# Patient Record
Sex: MALE | Race: WHITE | Employment: UNEMPLOYED | URBAN - METROPOLITAN AREA
[De-identification: names, ages, dates, MRNs, and addresses within clinical notes are randomized per-mention and may not be internally consistent; named-entity substitution may affect disease eponyms.]

---

## 2018-10-03 ENCOUNTER — HOSPITAL ENCOUNTER (EMERGENCY)
Age: 38
Discharge: HOME | End: 2018-10-03
Attending: EMERGENCY MEDICINE
Payer: COMMERCIAL

## 2018-10-03 VITALS
RESPIRATION RATE: 16 BRPM | TEMPERATURE: 98.8 F | BODY MASS INDEX: 21.25 KG/M2 | WEIGHT: 165.57 LBS | HEART RATE: 81 BPM | HEIGHT: 74 IN | OXYGEN SATURATION: 100 % | DIASTOLIC BLOOD PRESSURE: 71 MMHG | SYSTOLIC BLOOD PRESSURE: 123 MMHG

## 2018-10-03 DIAGNOSIS — L02.419 CELLULITIS AND ABSCESS OF LEG: Primary | ICD-10-CM

## 2018-10-03 DIAGNOSIS — L03.119 CELLULITIS AND ABSCESS OF LEG: Primary | ICD-10-CM

## 2018-10-03 PROCEDURE — 4500000023 HC ED LEVEL 3 PROCEDURE

## 2018-10-03 PROCEDURE — 6370000000 HC RX 637 (ALT 250 FOR IP): Performed by: EMERGENCY MEDICINE

## 2018-10-03 PROCEDURE — 99283 EMERGENCY DEPT VISIT LOW MDM: CPT

## 2018-10-03 RX ORDER — SULFAMETHOXAZOLE AND TRIMETHOPRIM 800; 160 MG/1; MG/1
1 TABLET ORAL 2 TIMES DAILY
Qty: 20 TABLET | Refills: 0 | Status: SHIPPED | OUTPATIENT
Start: 2018-10-03 | End: 2018-10-07

## 2018-10-03 RX ORDER — SULFAMETHOXAZOLE AND TRIMETHOPRIM 800; 160 MG/1; MG/1
1 TABLET ORAL ONCE
Status: COMPLETED | OUTPATIENT
Start: 2018-10-03 | End: 2018-10-03

## 2018-10-03 RX ORDER — CEPHALEXIN 250 MG/1
500 CAPSULE ORAL ONCE
Status: COMPLETED | OUTPATIENT
Start: 2018-10-03 | End: 2018-10-03

## 2018-10-03 RX ORDER — CEPHALEXIN 250 MG/1
250 CAPSULE ORAL 4 TIMES DAILY
Qty: 40 CAPSULE | Refills: 0 | Status: SHIPPED | OUTPATIENT
Start: 2018-10-03 | End: 2019-01-13

## 2018-10-03 RX ADMIN — CEPHALEXIN 500 MG: 250 CAPSULE ORAL at 22:33

## 2018-10-03 RX ADMIN — SULFAMETHOXAZOLE AND TRIMETHOPRIM 1 TABLET: 800; 160 TABLET ORAL at 22:33

## 2018-10-03 ASSESSMENT — ENCOUNTER SYMPTOMS
STRIDOR: 0
DIARRHEA: 0
CONSTIPATION: 0
CHOKING: 0
CHEST TIGHTNESS: 0
APNEA: 0
BLOOD IN STOOL: 0
COUGH: 0
EYE REDNESS: 0
EYE PAIN: 0
ANAL BLEEDING: 0
EYE DISCHARGE: 0
ABDOMINAL DISTENTION: 0
BACK PAIN: 0
COLOR CHANGE: 0
PHOTOPHOBIA: 0
VOMITING: 0
WHEEZING: 0
EYE ITCHING: 0
RECTAL PAIN: 0
ABDOMINAL PAIN: 0
SHORTNESS OF BREATH: 0
NAUSEA: 0

## 2018-10-03 ASSESSMENT — PAIN - FUNCTIONAL ASSESSMENT: PAIN_FUNCTIONAL_ASSESSMENT: 0-10

## 2018-10-03 ASSESSMENT — PAIN SCALES - GENERAL
PAINLEVEL_OUTOF10: 6
PAINLEVEL_OUTOF10: 6

## 2018-10-07 ENCOUNTER — HOSPITAL ENCOUNTER (EMERGENCY)
Age: 38
Discharge: HOME | End: 2018-10-07
Payer: COMMERCIAL

## 2018-10-07 VITALS
WEIGHT: 160.94 LBS | HEIGHT: 74 IN | HEART RATE: 71 BPM | BODY MASS INDEX: 20.65 KG/M2 | TEMPERATURE: 98.4 F | DIASTOLIC BLOOD PRESSURE: 79 MMHG | SYSTOLIC BLOOD PRESSURE: 125 MMHG | RESPIRATION RATE: 18 BRPM | OXYGEN SATURATION: 100 %

## 2018-10-07 DIAGNOSIS — L02.415 ABSCESS OF RIGHT LEG: ICD-10-CM

## 2018-10-07 DIAGNOSIS — L02.414 ABSCESS OF LEFT FOREARM: Primary | ICD-10-CM

## 2018-10-07 LAB
ANION GAP SERPL CALCULATED.3IONS-SCNC: 12 MMOL/L (ref 3–16)
BASOPHILS ABSOLUTE: 0.1 K/UL (ref 0–0.2)
BASOPHILS RELATIVE PERCENT: 0.8 %
BUN BLDV-MCNC: 7 MG/DL (ref 7–20)
CALCIUM SERPL-MCNC: 9.4 MG/DL (ref 8.3–10.6)
CHLORIDE BLD-SCNC: 97 MMOL/L (ref 99–110)
CO2: 28 MMOL/L (ref 21–32)
CREAT SERPL-MCNC: 0.8 MG/DL (ref 0.9–1.3)
EOSINOPHILS ABSOLUTE: 0.4 K/UL (ref 0–0.6)
EOSINOPHILS RELATIVE PERCENT: 4.1 %
GFR AFRICAN AMERICAN: >60
GFR NON-AFRICAN AMERICAN: >60
GLUCOSE BLD-MCNC: 88 MG/DL (ref 70–99)
HCT VFR BLD CALC: 41 % (ref 40.5–52.5)
HEMOGLOBIN: 14.3 G/DL (ref 13.5–17.5)
LACTIC ACID: 1 MMOL/L (ref 0.4–2)
LYMPHOCYTES ABSOLUTE: 2.6 K/UL (ref 1–5.1)
LYMPHOCYTES RELATIVE PERCENT: 26.3 %
MCH RBC QN AUTO: 29.2 PG (ref 26–34)
MCHC RBC AUTO-ENTMCNC: 34.9 G/DL (ref 31–36)
MCV RBC AUTO: 83.8 FL (ref 80–100)
MONOCYTES ABSOLUTE: 0.7 K/UL (ref 0–1.3)
MONOCYTES RELATIVE PERCENT: 7.3 %
NEUTROPHILS ABSOLUTE: 6.1 K/UL (ref 1.7–7.7)
NEUTROPHILS RELATIVE PERCENT: 61.5 %
PDW BLD-RTO: 14.8 % (ref 12.4–15.4)
PLATELET # BLD: 425 K/UL (ref 135–450)
PMV BLD AUTO: 6.9 FL (ref 5–10.5)
POTASSIUM SERPL-SCNC: 4.7 MMOL/L (ref 3.5–5.1)
RBC # BLD: 4.89 M/UL (ref 4.2–5.9)
SODIUM BLD-SCNC: 137 MMOL/L (ref 136–145)
WBC # BLD: 10 K/UL (ref 4–11)

## 2018-10-07 PROCEDURE — 36415 COLL VENOUS BLD VENIPUNCTURE: CPT

## 2018-10-07 PROCEDURE — 4500000023 HC ED LEVEL 3 PROCEDURE

## 2018-10-07 PROCEDURE — 80048 BASIC METABOLIC PNL TOTAL CA: CPT

## 2018-10-07 PROCEDURE — 85025 COMPLETE CBC W/AUTO DIFF WBC: CPT

## 2018-10-07 PROCEDURE — 99283 EMERGENCY DEPT VISIT LOW MDM: CPT

## 2018-10-07 PROCEDURE — 2500000003 HC RX 250 WO HCPCS

## 2018-10-07 PROCEDURE — 83605 ASSAY OF LACTIC ACID: CPT

## 2018-10-07 RX ORDER — SULFAMETHOXAZOLE AND TRIMETHOPRIM 800; 160 MG/1; MG/1
2 TABLET ORAL 2 TIMES DAILY
Qty: 40 TABLET | Refills: 0 | Status: SHIPPED | OUTPATIENT
Start: 2018-10-07 | End: 2018-10-17

## 2018-10-07 ASSESSMENT — PAIN SCALES - GENERAL: PAINLEVEL_OUTOF10: 8

## 2018-10-07 ASSESSMENT — PAIN - FUNCTIONAL ASSESSMENT: PAIN_FUNCTIONAL_ASSESSMENT: 0-10

## 2018-10-07 NOTE — ED PROVIDER NOTES
immediate complications      ED COURSE & MEDICAL DECISION MAKING    See chart for details of medications prescribed. Vitals:    10/07/18 1821   BP: 125/79   Pulse: 71   Resp: 18   Temp: 98.4 °F (36.9 °C)   TempSrc: Oral   SpO2: 100%   Weight: 160 lb 15 oz (73 kg)   Height: 6' 2\" (1.88 m)       Differential diagnosis: necrotizing fasciitis, deep space soft tissue bacterial skin infection, viral rash, systemic infectious rash, aseptic cyst, malignancy, lymphadenopathy, other    Patient is afebrile and nontoxic in appearance. No constitutional systems. Labs reveal no leukocytosis. Lactic acid within normal limits. No evidence of sepsis at this time. Bactrim dose increased to 2 tabs BID. Keflex will be continued. I instructed the patient to follow up in 2 days for wound recheck and packing removal.  I instructed the patient to return to the ED immediately for any new or worsening symptoms. The patient verbalized understanding. I have evaluated this patient. My supervising physician was available for consultation. FINAL IMPRESSION    1. Abscess of left forearm    2.  Abscess of right leg        PLAN  Antibiotics, oral analgesics, follow-up (see EMR)      (Please note that this note was completed with a voice recognition program.  Every attempt was made to edit the dictations, but inevitably there remain words that are mis-transcribed.)                  Tima Colon  10/07/18 2041

## 2019-01-13 ENCOUNTER — HOSPITAL ENCOUNTER (EMERGENCY)
Age: 39
Discharge: HOME | End: 2019-01-13

## 2019-01-13 VITALS
OXYGEN SATURATION: 97 % | SYSTOLIC BLOOD PRESSURE: 117 MMHG | BODY MASS INDEX: 24.78 KG/M2 | DIASTOLIC BLOOD PRESSURE: 73 MMHG | HEIGHT: 74 IN | WEIGHT: 193.12 LBS | RESPIRATION RATE: 17 BRPM | TEMPERATURE: 98.6 F | HEART RATE: 83 BPM

## 2019-01-13 DIAGNOSIS — L02.31 ABSCESS OF BUTTOCK: Primary | ICD-10-CM

## 2019-01-13 PROCEDURE — 99282 EMERGENCY DEPT VISIT SF MDM: CPT

## 2019-01-13 RX ORDER — SULFAMETHOXAZOLE AND TRIMETHOPRIM 800; 160 MG/1; MG/1
1 TABLET ORAL 2 TIMES DAILY
Qty: 20 TABLET | Refills: 0 | Status: SHIPPED | OUTPATIENT
Start: 2019-01-13 | End: 2019-01-23

## 2019-01-13 RX ORDER — CEPHALEXIN 500 MG/1
500 CAPSULE ORAL 4 TIMES DAILY
Qty: 40 CAPSULE | Refills: 0 | Status: SHIPPED | OUTPATIENT
Start: 2019-01-13 | End: 2021-06-02

## 2019-01-13 ASSESSMENT — PAIN DESCRIPTION - LOCATION: LOCATION: BUTTOCKS

## 2019-01-13 ASSESSMENT — ENCOUNTER SYMPTOMS
SHORTNESS OF BREATH: 0
ABDOMINAL PAIN: 0
EYE DISCHARGE: 0
BACK PAIN: 0
APNEA: 0
FACIAL SWELLING: 0
VOMITING: 0
EYE REDNESS: 0
CHOKING: 0
NAUSEA: 0

## 2019-01-13 ASSESSMENT — PAIN SCALES - GENERAL
PAINLEVEL_OUTOF10: 4
PAINLEVEL_OUTOF10: 8

## 2019-01-13 ASSESSMENT — PAIN - FUNCTIONAL ASSESSMENT: PAIN_FUNCTIONAL_ASSESSMENT: 0-10

## 2019-01-13 ASSESSMENT — PAIN DESCRIPTION - PAIN TYPE: TYPE: ACUTE PAIN

## 2019-01-13 ASSESSMENT — PAIN DESCRIPTION - DESCRIPTORS: DESCRIPTORS: ACHING

## 2019-01-13 ASSESSMENT — PAIN DESCRIPTION - PROGRESSION: CLINICAL_PROGRESSION: NOT CHANGED

## 2019-01-13 ASSESSMENT — PAIN DESCRIPTION - FREQUENCY: FREQUENCY: CONTINUOUS

## 2021-06-02 ENCOUNTER — HOSPITAL ENCOUNTER (EMERGENCY)
Age: 41
Discharge: HOME | End: 2021-06-02
Payer: COMMERCIAL

## 2021-06-02 VITALS
RESPIRATION RATE: 16 BRPM | DIASTOLIC BLOOD PRESSURE: 80 MMHG | HEART RATE: 61 BPM | TEMPERATURE: 98 F | OXYGEN SATURATION: 100 % | SYSTOLIC BLOOD PRESSURE: 137 MMHG

## 2021-06-02 DIAGNOSIS — K04.7 ABSCESSED TOOTH: Primary | ICD-10-CM

## 2021-06-02 PROCEDURE — 99283 EMERGENCY DEPT VISIT LOW MDM: CPT

## 2021-06-02 RX ORDER — IBUPROFEN 600 MG/1
600 TABLET ORAL EVERY 6 HOURS PRN
Qty: 30 TABLET | Refills: 0 | Status: SHIPPED | OUTPATIENT
Start: 2021-06-02

## 2021-06-02 RX ORDER — AMOXICILLIN 500 MG/1
1000 CAPSULE ORAL 2 TIMES DAILY
Qty: 40 CAPSULE | Refills: 0 | Status: SHIPPED | OUTPATIENT
Start: 2021-06-02 | End: 2021-06-12

## 2021-06-02 ASSESSMENT — PAIN DESCRIPTION - LOCATION
LOCATION: TEETH
LOCATION: TEETH

## 2021-06-02 ASSESSMENT — PAIN SCALES - GENERAL
PAINLEVEL_OUTOF10: 8
PAINLEVEL_OUTOF10: 7

## 2021-06-02 ASSESSMENT — ENCOUNTER SYMPTOMS
SHORTNESS OF BREATH: 0
VOICE CHANGE: 0
FACIAL SWELLING: 1
NAUSEA: 0
VOMITING: 0
TROUBLE SWALLOWING: 0

## 2021-06-02 ASSESSMENT — PAIN DESCRIPTION - PAIN TYPE: TYPE: ACUTE PAIN

## 2021-06-02 ASSESSMENT — PAIN DESCRIPTION - ORIENTATION: ORIENTATION: LEFT;UPPER

## 2021-06-02 ASSESSMENT — PAIN DESCRIPTION - DESCRIPTORS
DESCRIPTORS: CONSTANT;THROBBING
DESCRIPTORS: ACHING

## 2021-06-02 NOTE — ED PROVIDER NOTES
[address] [facility]      Pt Name: Addy Mari  MRN: [id number]  [place] 1980  Date of evaluation: 6/2/2021  Provider: Yasir Neumann PA-C    This patient was not seen and evaluated by the attending physician No att. providers found. CHIEF COMPLAINT       Chief Complaint   Patient presents with    Dental Problem     left upper side, started 2 days ago. \"I need abx\"         HISTORYOF PRESENT ILLNESS  (Location/Symptom, Timing/Onset, Context/Setting, Quality, Duration, Modifying Factors, Severity.)   Addy Mari is a 38 y.o. male who presents to the emergency department with dental abscess. 2 days ago he developed pain and swelling about his left upper tooth pain. Pain has been constant since that time. It worsens with chewing. Nothing makes it better. He rates it 8 out of 10. He denies any fevers, chills, difficulty breathing or swallowing, drooling or voice changes. He does not have a dentist.      Nursing Notes were reviewedand I agree. REVIEW OF SYSTEMS    (2-9 systems for level 4, 10 or more forlevel 5)     Review of Systems   Constitutional: Negative for chills and fever. HENT: Positive for dental problem and facial swelling. Negative for trouble swallowing and voice change. Respiratory: Negative for shortness of breath. Gastrointestinal: Negative for nausea and vomiting. Musculoskeletal: Negative for neck pain. Neurological: Negative for headaches. All other systems reviewed and are negative. Except as noted above the remainder ofthe review of systems was reviewed and negative.        PAST MEDICALHISTORY         Diagnosis Date    Opiate abuse, episodic ([facility] [address]        SURGICAL HISTORY           Procedure Laterality Date    ABDOMEN SURGERY      spleen    DENTAL SURGERY      FRACTURE SURGERY      left ankle       CURRENT MEDICATIONS       Previous Medications    No medications on file       ALLERGIES Patient has no known allergies. FAMILY HISTORY     History reviewed. No pertinent family history. No family status information on file. SOCIAL HISTORY    reports that he has been smoking cigarettes. He has been smoking about 0.50 packs per day. He has never used smokeless tobacco. He reports current drug use. Drugs: IV and Opiates . He reports that he does not drink alcohol. PHYSICAL EXAM    (up to 7 for level 4, 8 or more for level 5)     ED Triage Vitals [06/02/21 0930]   BP Temp Temp Source Pulse Resp SpO2 Height Weight   137/80 98 °F (36.7 °C) Oral 61 16 100 % -- --       Physical Exam  Vitals and nursing note reviewed. Constitutional:       General: He is not in acute distress. Appearance: He is well-developed. HENT:      Head: Normocephalic and atraumatic. Mouth/Throat:        Comments: All teeth are severely decayed to the gumline. There is a bit of upper lip swelling on the left. Pulmonary:      Effort: Pulmonary effort is normal. No respiratory distress. Musculoskeletal:         General: Normal range of motion. Cervical back: Neck supple. Skin:     General: Skin is warm and dry. Neurological:      Mental Status: He is alert and oriented to person, place, and time. Psychiatric:         Behavior: Behavior normal.              EMERGENCY DEPARTMENT COURSE and DIFFERENTIAL DIAGNOSIS/MDM:   Vitals:    Vitals:    06/02/21 0930   BP: 137/80   Pulse: 61   Resp: 16   Temp: 98 °F (36.7 °C)   TempSrc: Oral   SpO2: 100%        I have evaluated this patient. My supervising physician was available for consultation. I estimate there is LOW risk for a DEEP SPACE INFECTION (e.g., CHRISTIANS ANGINA OR RETROPHARYNGEAL ABSCESS), MENINGITIS, or AIRWAY COMPROMISE, thus I consider the discharge disposition reasonable. Also, there is no evidence or peritonitis, sepsis, or toxicity. Discussed results, diagnosis and plan with patient and/or family. Questions addressed.  Eladia Lopez follow-up agreed upon. Specific discharge instructions explained. The patient and/or family and I have discussed the diagnosis and risks, and we agree with discharging home to follow-up with their primary care,specialist or referral doctor. We also discussed returning to the Emergency Department immediately if new or worsening symptoms occur. We have discussed the symptoms which are most concerning that necessitate immediatereturn. PROCEDURES:  None    FINAL IMPRESSION      1.  Abscessed tooth          DISPOSITION/PLAN   DISPOSITION Decision To Discharge 06/02/2021 09:40:35 AM      PATIENT REFERRED TO:  [facility]. [phone]  [id number] [address]  [phone]    Schedule an appointment as soon as possible for a visit   or see dental clinic list below      MEDICATIONS:  New Prescriptions    AMOXICILLIN (AMOXIL) 500 MG CAPSULE    Take 2 capsules by mouth 2 times daily for 10 days    IBUPROFEN (IBU) 600 MG TABLET    Take 1 tablet by mouth every 6 hours as needed for Pain       (Please note that portions of this note were completed with a voice recognition program.  Efforts were made toedit the dictations but occasionally words are mis-transcribed.)    NOMAN Casper PA-C  06/02/21 9946

## 2024-05-15 ENCOUNTER — APPOINTMENT (OUTPATIENT)
Dept: GENERAL RADIOLOGY | Age: 44
DRG: 720 | End: 2024-05-15
Payer: COMMERCIAL

## 2024-05-15 ENCOUNTER — HOSPITAL ENCOUNTER (EMERGENCY)
Age: 44
Discharge: HOME | DRG: 720 | End: 2024-05-15
Attending: EMERGENCY MEDICINE
Payer: COMMERCIAL

## 2024-05-15 VITALS
BODY MASS INDEX: 21.48 KG/M2 | TEMPERATURE: 98.3 F | WEIGHT: 167.4 LBS | OXYGEN SATURATION: 95 % | HEIGHT: 74 IN | RESPIRATION RATE: 16 BRPM | HEART RATE: 88 BPM | DIASTOLIC BLOOD PRESSURE: 78 MMHG | SYSTOLIC BLOOD PRESSURE: 110 MMHG

## 2024-05-15 DIAGNOSIS — N30.01 ACUTE CYSTITIS WITH HEMATURIA: Primary | ICD-10-CM

## 2024-05-15 DIAGNOSIS — F14.10 NONDEPENDENT COCAINE ABUSE (HCC): ICD-10-CM

## 2024-05-15 DIAGNOSIS — Z20.822 LAB TEST NEGATIVE FOR COVID-19 VIRUS: ICD-10-CM

## 2024-05-15 DIAGNOSIS — R41.89 BRAIN FOG: ICD-10-CM

## 2024-05-15 DIAGNOSIS — E86.0 DEHYDRATION: ICD-10-CM

## 2024-05-15 DIAGNOSIS — J18.9 PNEUMONIA OF BOTH LOWER LOBES DUE TO INFECTIOUS ORGANISM: ICD-10-CM

## 2024-05-15 LAB
ALBUMIN SERPL-MCNC: 3.3 G/DL (ref 3.4–5)
ALBUMIN/GLOB SERPL: 0.9 {RATIO} (ref 1.1–2.2)
ALP SERPL-CCNC: 160 U/L (ref 40–129)
ALT SERPL-CCNC: 51 U/L (ref 10–40)
AMORPH SED URNS QL MICRO: ABNORMAL /HPF
AMPHETAMINES UR QL SCN>1000 NG/ML: POSITIVE
ANION GAP SERPL CALCULATED.3IONS-SCNC: 15 MMOL/L (ref 3–16)
AST SERPL-CCNC: 70 U/L (ref 15–37)
BACTERIA URNS QL MICRO: ABNORMAL /HPF
BARBITURATES UR QL SCN>200 NG/ML: ABNORMAL
BASE EXCESS BLDV CALC-SCNC: -1 MMOL/L (ref -3–3)
BASOPHILS # BLD: 0.1 K/UL (ref 0–0.2)
BASOPHILS NFR BLD: 1 %
BENZODIAZ UR QL SCN>200 NG/ML: ABNORMAL
BILIRUB SERPL-MCNC: 2.1 MG/DL (ref 0–1)
BILIRUB UR QL STRIP.AUTO: ABNORMAL
BUN SERPL-MCNC: 44 MG/DL (ref 7–20)
CALCIUM SERPL-MCNC: 9.6 MG/DL (ref 8.3–10.6)
CANNABINOIDS UR QL SCN>50 NG/ML: ABNORMAL
CHLORIDE SERPL-SCNC: 96 MMOL/L (ref 99–110)
CLARITY UR: ABNORMAL
CO2 BLDV-SCNC: 25 MMOL/L
CO2 SERPL-SCNC: 23 MMOL/L (ref 21–32)
COCAINE UR QL SCN: POSITIVE
COLOR UR: ABNORMAL
CREAT SERPL-MCNC: 1.2 MG/DL (ref 0.9–1.3)
DEPRECATED RDW RBC AUTO: 14.2 % (ref 12.4–15.4)
DRUG SCREEN COMMENT UR-IMP: ABNORMAL
EOSINOPHIL # BLD: 0.1 K/UL (ref 0–0.6)
EOSINOPHIL NFR BLD: 1 %
EPI CELLS #/AREA URNS HPF: ABNORMAL /HPF (ref 0–5)
FENTANYL SCREEN, URINE: POSITIVE
FLUAV RNA UPPER RESP QL NAA+PROBE: NEGATIVE
FLUBV AG NPH QL: NEGATIVE
GFR SERPLBLD CREATININE-BSD FMLA CKD-EPI: 76 ML/MIN/{1.73_M2}
GLUCOSE SERPL-MCNC: 127 MG/DL (ref 70–99)
GLUCOSE UR STRIP.AUTO-MCNC: NEGATIVE MG/DL
HCO3 BLDV-SCNC: 24.9 MMOL/L (ref 23–29)
HCT VFR BLD AUTO: 46.2 % (ref 40.5–52.5)
HGB BLD-MCNC: 15.8 G/DL (ref 13.5–17.5)
HGB UR QL STRIP.AUTO: ABNORMAL
KETONES UR STRIP.AUTO-MCNC: NEGATIVE MG/DL
LACTATE BLDV-SCNC: 2.6 MMOL/L (ref 0.4–2)
LACTATE BLDV-SCNC: 3.6 MMOL/L (ref 0.4–2)
LEUKOCYTE ESTERASE UR QL STRIP.AUTO: ABNORMAL
LIPASE SERPL-CCNC: 9 U/L (ref 13–60)
LYMPHOCYTES # BLD: 1.3 K/UL (ref 1–5.1)
LYMPHOCYTES NFR BLD: 9 %
MCH RBC QN AUTO: 30.3 PG (ref 26–34)
MCHC RBC AUTO-ENTMCNC: 34.2 G/DL (ref 31–36)
MCV RBC AUTO: 88.7 FL (ref 80–100)
METHADONE UR QL SCN>300 NG/ML: ABNORMAL
MONOCYTES # BLD: 1 K/UL (ref 0–1.3)
MONOCYTES NFR BLD: 7 %
NEUTROPHILS # BLD: 11.4 K/UL (ref 1.7–7.7)
NEUTROPHILS NFR BLD: 80 %
NEUTS BAND NFR BLD MANUAL: 2 % (ref 0–7)
NITRITE UR QL STRIP.AUTO: POSITIVE
O2 THERAPY: ABNORMAL
OPIATES UR QL SCN>300 NG/ML: ABNORMAL
OXYCODONE UR QL SCN: ABNORMAL
PCO2 BLDV: 46.8 MMHG (ref 40–50)
PCP UR QL SCN>25 NG/ML: ABNORMAL
PH BLDV: 7.33 [PH] (ref 7.35–7.45)
PH UR STRIP.AUTO: 6 [PH] (ref 5–8)
PH UR STRIP: 6 [PH]
PLATELET # BLD AUTO: 71 K/UL (ref 135–450)
PLATELET BLD QL SMEAR: ABNORMAL
PMV BLD AUTO: 10.4 FL (ref 5–10.5)
PO2 BLDV: 34.6 MMHG (ref 25–40)
POTASSIUM SERPL-SCNC: 4.7 MMOL/L (ref 3.5–5.1)
PROT SERPL-MCNC: 7.1 G/DL (ref 6.4–8.2)
PROT UR STRIP.AUTO-MCNC: 30 MG/DL
RBC # BLD AUTO: 5.21 M/UL (ref 4.2–5.9)
RBC #/AREA URNS HPF: ABNORMAL /HPF (ref 0–4)
S PYO AG THROAT QL: NEGATIVE
SAO2 % BLDV: 62 %
SARS-COV-2 RDRP RESP QL NAA+PROBE: NOT DETECTED
SLIDE REVIEW: ABNORMAL
SODIUM SERPL-SCNC: 134 MMOL/L (ref 136–145)
SP GR UR STRIP.AUTO: 1.01 (ref 1–1.03)
UA COMPLETE W REFLEX CULTURE PNL UR: YES
UA DIPSTICK W REFLEX MICRO PNL UR: YES
URN SPEC COLLECT METH UR: ABNORMAL
UROBILINOGEN UR STRIP-ACNC: 4 E.U./DL
WBC # BLD AUTO: 13.9 K/UL (ref 4–11)
WBC #/AREA URNS HPF: ABNORMAL /HPF (ref 0–5)

## 2024-05-15 PROCEDURE — 87804 INFLUENZA ASSAY W/OPTIC: CPT

## 2024-05-15 PROCEDURE — 83690 ASSAY OF LIPASE: CPT

## 2024-05-15 PROCEDURE — 87086 URINE CULTURE/COLONY COUNT: CPT

## 2024-05-15 PROCEDURE — 87040 BLOOD CULTURE FOR BACTERIA: CPT

## 2024-05-15 PROCEDURE — 87081 CULTURE SCREEN ONLY: CPT

## 2024-05-15 PROCEDURE — 80307 DRUG TEST PRSMV CHEM ANLYZR: CPT

## 2024-05-15 PROCEDURE — 6370000000 HC RX 637 (ALT 250 FOR IP): Performed by: EMERGENCY MEDICINE

## 2024-05-15 PROCEDURE — 87186 SC STD MICRODIL/AGAR DIL: CPT

## 2024-05-15 PROCEDURE — 71046 X-RAY EXAM CHEST 2 VIEWS: CPT

## 2024-05-15 PROCEDURE — 82803 BLOOD GASES ANY COMBINATION: CPT

## 2024-05-15 PROCEDURE — 36415 COLL VENOUS BLD VENIPUNCTURE: CPT

## 2024-05-15 PROCEDURE — 2580000003 HC RX 258: Performed by: EMERGENCY MEDICINE

## 2024-05-15 PROCEDURE — 99284 EMERGENCY DEPT VISIT MOD MDM: CPT

## 2024-05-15 PROCEDURE — 87635 SARS-COV-2 COVID-19 AMP PRB: CPT

## 2024-05-15 PROCEDURE — 87150 DNA/RNA AMPLIFIED PROBE: CPT

## 2024-05-15 PROCEDURE — 83605 ASSAY OF LACTIC ACID: CPT

## 2024-05-15 PROCEDURE — 87880 STREP A ASSAY W/OPTIC: CPT

## 2024-05-15 PROCEDURE — 87077 CULTURE AEROBIC IDENTIFY: CPT

## 2024-05-15 PROCEDURE — 96361 HYDRATE IV INFUSION ADD-ON: CPT

## 2024-05-15 PROCEDURE — 85025 COMPLETE CBC W/AUTO DIFF WBC: CPT

## 2024-05-15 PROCEDURE — 81001 URINALYSIS AUTO W/SCOPE: CPT

## 2024-05-15 PROCEDURE — 6360000002 HC RX W HCPCS: Performed by: EMERGENCY MEDICINE

## 2024-05-15 PROCEDURE — 96374 THER/PROPH/DIAG INJ IV PUSH: CPT

## 2024-05-15 PROCEDURE — 80053 COMPREHEN METABOLIC PANEL: CPT

## 2024-05-15 RX ORDER — PREDNISONE 20 MG/1
40 TABLET ORAL ONCE
Status: COMPLETED | OUTPATIENT
Start: 2024-05-15 | End: 2024-05-15

## 2024-05-15 RX ORDER — 0.9 % SODIUM CHLORIDE 0.9 %
1000 INTRAVENOUS SOLUTION INTRAVENOUS ONCE
Status: COMPLETED | OUTPATIENT
Start: 2024-05-15 | End: 2024-05-15

## 2024-05-15 RX ORDER — METHYLPREDNISOLONE 4 MG/1
4 TABLET ORAL SEE ADMIN INSTRUCTIONS
Qty: 1 KIT | Refills: 0 | Status: SHIPPED | OUTPATIENT
Start: 2024-05-15 | End: 2024-05-21

## 2024-05-15 RX ORDER — AZITHROMYCIN 500 MG/1
500 TABLET, FILM COATED ORAL ONCE
Status: COMPLETED | OUTPATIENT
Start: 2024-05-15 | End: 2024-05-15

## 2024-05-15 RX ORDER — ALBUTEROL SULFATE 90 UG/1
2 AEROSOL, METERED RESPIRATORY (INHALATION) EVERY 4 HOURS PRN
Qty: 18 G | Refills: 0 | Status: SHIPPED | OUTPATIENT
Start: 2024-05-15

## 2024-05-15 RX ORDER — AMOXICILLIN AND CLAVULANATE POTASSIUM 875; 125 MG/1; MG/1
1 TABLET, FILM COATED ORAL 2 TIMES DAILY
Qty: 20 TABLET | Refills: 0 | Status: SHIPPED | OUTPATIENT
Start: 2024-05-15 | End: 2024-05-25

## 2024-05-15 RX ADMIN — PREDNISONE 40 MG: 20 TABLET ORAL at 12:16

## 2024-05-15 RX ADMIN — WATER 1000 MG: 1 INJECTION INTRAMUSCULAR; INTRAVENOUS; SUBCUTANEOUS at 17:28

## 2024-05-15 RX ADMIN — SODIUM CHLORIDE 1000 ML: 9 INJECTION, SOLUTION INTRAVENOUS at 17:20

## 2024-05-15 RX ADMIN — AZITHROMYCIN 500 MG: 500 TABLET, FILM COATED ORAL at 17:28

## 2024-05-15 RX ADMIN — SODIUM CHLORIDE 1000 ML: 9 INJECTION, SOLUTION INTRAVENOUS at 14:54

## 2024-05-15 ASSESSMENT — PAIN SCALES - GENERAL
PAINLEVEL_OUTOF10: 3
PAINLEVEL_OUTOF10: 5
PAINLEVEL_OUTOF10: 10

## 2024-05-15 ASSESSMENT — PAIN DESCRIPTION - ORIENTATION
ORIENTATION: RIGHT;LEFT

## 2024-05-15 ASSESSMENT — PAIN DESCRIPTION - FREQUENCY
FREQUENCY: CONTINUOUS

## 2024-05-15 ASSESSMENT — PAIN DESCRIPTION - ONSET
ONSET: GRADUAL
ONSET: GRADUAL

## 2024-05-15 ASSESSMENT — PAIN - FUNCTIONAL ASSESSMENT
PAIN_FUNCTIONAL_ASSESSMENT: ACTIVITIES ARE NOT PREVENTED
PAIN_FUNCTIONAL_ASSESSMENT: 0-10
PAIN_FUNCTIONAL_ASSESSMENT: 0-10
PAIN_FUNCTIONAL_ASSESSMENT: ACTIVITIES ARE NOT PREVENTED

## 2024-05-15 ASSESSMENT — LIFESTYLE VARIABLES
HOW OFTEN DO YOU HAVE A DRINK CONTAINING ALCOHOL: NEVER
HOW MANY STANDARD DRINKS CONTAINING ALCOHOL DO YOU HAVE ON A TYPICAL DAY: PATIENT DOES NOT DRINK

## 2024-05-15 ASSESSMENT — PAIN DESCRIPTION - LOCATION
LOCATION: LEG

## 2024-05-15 ASSESSMENT — PAIN DESCRIPTION - DESCRIPTORS
DESCRIPTORS: SORE

## 2024-05-15 ASSESSMENT — PAIN DESCRIPTION - PAIN TYPE
TYPE: ACUTE PAIN
TYPE: ACUTE PAIN

## 2024-05-15 NOTE — ED NOTES
This RN attempted twice to place an PIV; both attempts unsuccessful. I asked PIETER Villeda to attempt.

## 2024-05-15 NOTE — ED NOTES
Diabetes is unchanged.   Continue current treatment regimen.  Diabetes will be reassessed in 3 months.    Discussed importance of medication compliance and better control over blood sugars. Pt to have A1C checked today, continue regular follow up with Dr. Siddiqi.      Pt is currently working on a urine sample for this RN.

## 2024-05-15 NOTE — ED PROVIDER NOTES
[facility]  EMERGENCY DEPARTMENT ENCOUNTER      Pt Name: Romeo Handley  MRN: [id number]  Birthdate 1980  Date of evaluation: 5/15/2024  Provider: KARLO HERNÁNDEZ DO    CHIEF COMPLAINT  Chief Complaint   Patient presents with    Pain     Pt states that he has been shooting fentanyl in his lower legs the past few days, he reports Friday he has been c/o leg pain that started in the right and has since traveled to the [facility], pt states he is having trouble bearing weight , he reports feeling weak all over  pt has strong bi lat pedal pulses and cap refill < 3 ...     Cough     States had a slight cough for the past few days reports a fever at home          This patient is at risk for a communicable infection.  Therefore, personal protection equipment consisting of a mask was worn for the exam.    HPI  Romeo Handley is a 43 y.o. male who presents with complaint of shooting fentanyl in his lower legs the past few days.  He has used methamphetamines in the past but has quit using methamphetamines.  He states he is having chest pain with coughing.  Denies any fevers or chills.  His wife later came in and gave a history that he was having periods of confusion.  He smokes 1 pack of cigarettes per day.  He denies any exposure to flu or COVID.  Denies any previous history of pulmonary embolus or DVTs.  He does have a history of IV drug abuse and opiate abuse.  He states he been feeling weak all over.  He denies any focal neurologic signs.  He denies any wheezing.  ?  REVIEW OF SYSTEMS  All systems negative except as noted in the HPI.  Reviewed Nurses' notes and concur.    No LMP for male patient.    PAST MEDICAL HISTORY  Past Medical History:   Diagnosis Date    Opiate abuse, episodic (HCC)        FAMILY HISTORY  History reviewed. No pertinent family history.    SOCIAL HISTORY   reports that he has been smoking cigarettes. He has never used smokeless tobacco. He reports current drug use.  No masses, No pulsatile masses, No distension, normal bowel sounds  Skin: Warm, Dry  Back: No tenderness, Full range of motion, No scoliosis.  Extremities: no major deformities noted.  Neurologic: Alert & oriented x 3, CN II through XII are intact, normal motor function, normal sensory function, no focal deficits noted, no clonus, no tremors.  Psychiatric: Affect normal, Mood normal.    COURSE & MEDICAL DECISION MAKING  Pertinent Labs & Imaging studies reviewed. (See chart for details)    LABORATORY  Labs Reviewed   CBC WITH AUTO DIFFERENTIAL - Abnormal; Notable for the following components:       Result Value    WBC 13.9 (*)     Platelets 71 (*)     Neutrophils Absolute 11.4 (*)     All other components within normal limits   COMPREHENSIVE METABOLIC PANEL W/ REFLEX TO MG FOR LOW K - Abnormal; Notable for the following components:    Sodium 134 (*)     Chloride 96 (*)     Glucose 127 (*)     BUN 44 (*)     Albumin 3.3 (*)     Albumin/Globulin Ratio 0.9 (*)     Total Bilirubin 2.1 (*)     Alkaline Phosphatase 160 (*)     ALT 51 (*)     AST 70 (*)     All other components within normal limits   LIPASE - Abnormal; Notable for the following components:    Lipase 9.0 (*)     All other components within normal limits   URINALYSIS WITH REFLEX TO CULTURE - Abnormal; Notable for the following components:    Color, UA DARK YELLOW (*)     Clarity, UA CLOUDY (*)     Bilirubin, Urine SMALL (*)     Blood, Urine LARGE (*)     Protein, UA 30 (*)     Urobilinogen, Urine 4.0 (*)     Nitrite, Urine POSITIVE (*)     Leukocyte Esterase, Urine SMALL (*)     All other components within normal limits   BLOOD GAS, VENOUS - Abnormal; Notable for the following components:    pH, Romel 7.333 (*)     All other components within normal limits   URINE DRUG SCREEN - Abnormal; Notable for the following components:    Amphetamine Screen, Ur POSITIVE (*)     Cocaine Metabolite Screen, Urine POSITIVE (*)     FENTANYL SCREEN, URINE POSITIVE (*)     All

## 2024-05-16 LAB — REPORT: NORMAL

## 2024-05-17 ENCOUNTER — HOSPITAL ENCOUNTER (INPATIENT)
Age: 44
LOS: 1 days | Discharge: ANOTHER HOSPITAL | DRG: 720 | End: 2024-05-18
Attending: STUDENT IN AN ORGANIZED HEALTH CARE EDUCATION/TRAINING PROGRAM | Admitting: HOSPITALIST
Payer: COMMERCIAL

## 2024-05-17 ENCOUNTER — APPOINTMENT (OUTPATIENT)
Dept: GENERAL RADIOLOGY | Age: 44
DRG: 720 | End: 2024-05-17
Payer: COMMERCIAL

## 2024-05-17 DIAGNOSIS — M79.89 BILATERAL SWELLING OF FEET: ICD-10-CM

## 2024-05-17 DIAGNOSIS — R78.81 BACTEREMIA: Primary | ICD-10-CM

## 2024-05-17 DIAGNOSIS — J18.9 PNEUMONIA DUE TO INFECTIOUS ORGANISM, UNSPECIFIED LATERALITY, UNSPECIFIED PART OF LUNG: ICD-10-CM

## 2024-05-17 DIAGNOSIS — A41.9 SEPTICEMIA (HCC): ICD-10-CM

## 2024-05-17 PROBLEM — B95.61 STAPHYLOCOCCUS AUREUS BACTEREMIA: Status: ACTIVE | Noted: 2024-05-17

## 2024-05-17 PROBLEM — N39.0 UTI (URINARY TRACT INFECTION): Status: ACTIVE | Noted: 2024-05-17

## 2024-05-17 PROBLEM — F19.10 DRUG ABUSE (HCC): Status: ACTIVE | Noted: 2024-05-17

## 2024-05-17 LAB
ALBUMIN SERPL-MCNC: 2.8 G/DL (ref 3.4–5)
ALBUMIN/GLOB SERPL: 0.7 {RATIO} (ref 1.1–2.2)
ALP SERPL-CCNC: 151 U/L (ref 40–129)
ALT SERPL-CCNC: 46 U/L (ref 10–40)
ANION GAP SERPL CALCULATED.3IONS-SCNC: 12 MMOL/L (ref 3–16)
AST SERPL-CCNC: 79 U/L (ref 15–37)
BACTERIA UR CULT: ABNORMAL
BASOPHILS # BLD: 0 K/UL (ref 0–0.2)
BASOPHILS NFR BLD: 0 %
BILIRUB SERPL-MCNC: 1.3 MG/DL (ref 0–1)
BUN SERPL-MCNC: 44 MG/DL (ref 7–20)
CALCIUM SERPL-MCNC: 8.3 MG/DL (ref 8.3–10.6)
CHLORIDE SERPL-SCNC: 101 MMOL/L (ref 99–110)
CO2 SERPL-SCNC: 22 MMOL/L (ref 21–32)
CREAT SERPL-MCNC: 0.7 MG/DL (ref 0.9–1.3)
DEPRECATED RDW RBC AUTO: 14.2 % (ref 12.4–15.4)
EOSINOPHIL # BLD: 0 K/UL (ref 0–0.6)
EOSINOPHIL NFR BLD: 0 %
GFR SERPLBLD CREATININE-BSD FMLA CKD-EPI: >90 ML/MIN/{1.73_M2}
GLUCOSE SERPL-MCNC: 122 MG/DL (ref 70–99)
HCT VFR BLD AUTO: 41.2 % (ref 40.5–52.5)
HGB BLD-MCNC: 14.2 G/DL (ref 13.5–17.5)
LACTATE BLDV-SCNC: 2 MMOL/L (ref 0.4–2)
LYMPHOCYTES # BLD: 1 K/UL (ref 1–5.1)
LYMPHOCYTES NFR BLD: 4 %
MCH RBC QN AUTO: 30.4 PG (ref 26–34)
MCHC RBC AUTO-ENTMCNC: 34.6 G/DL (ref 31–36)
MCV RBC AUTO: 87.8 FL (ref 80–100)
MONOCYTES # BLD: 0.6 K/UL (ref 0–1.3)
MONOCYTES NFR BLD: 3 %
NEUTROPHILS # BLD: 19 K/UL (ref 1.7–7.7)
NEUTROPHILS NFR BLD: 89 %
NEUTS BAND NFR BLD MANUAL: 3 % (ref 0–7)
NEUTS VAC BLD QL SMEAR: PRESENT
NT-PROBNP SERPL-MCNC: 1581 PG/ML (ref 0–124)
ORGANISM: ABNORMAL
PLATELET # BLD AUTO: 77 K/UL (ref 135–450)
PMV BLD AUTO: 9.5 FL (ref 5–10.5)
POTASSIUM SERPL-SCNC: 5.6 MMOL/L (ref 3.5–5.1)
PROT SERPL-MCNC: 6.6 G/DL (ref 6.4–8.2)
RBC # BLD AUTO: 4.69 M/UL (ref 4.2–5.9)
RBC MORPH BLD: NORMAL
SODIUM SERPL-SCNC: 135 MMOL/L (ref 136–145)
TOXIC GRANULES BLD QL SMEAR: PRESENT
TROPONIN, HIGH SENSITIVITY: 16 NG/L (ref 0–22)
VARIANT LYMPHS NFR BLD MANUAL: 1 % (ref 0–6)
WBC # BLD AUTO: 20.7 K/UL (ref 4–11)

## 2024-05-17 PROCEDURE — 71045 X-RAY EXAM CHEST 1 VIEW: CPT

## 2024-05-17 PROCEDURE — 93005 ELECTROCARDIOGRAM TRACING: CPT | Performed by: STUDENT IN AN ORGANIZED HEALTH CARE EDUCATION/TRAINING PROGRAM

## 2024-05-17 PROCEDURE — 84484 ASSAY OF TROPONIN QUANT: CPT

## 2024-05-17 PROCEDURE — 80053 COMPREHEN METABOLIC PANEL: CPT

## 2024-05-17 PROCEDURE — 1200000000 HC SEMI PRIVATE

## 2024-05-17 PROCEDURE — 6360000002 HC RX W HCPCS: Performed by: STUDENT IN AN ORGANIZED HEALTH CARE EDUCATION/TRAINING PROGRAM

## 2024-05-17 PROCEDURE — 99285 EMERGENCY DEPT VISIT HI MDM: CPT

## 2024-05-17 PROCEDURE — 2580000003 HC RX 258: Performed by: STUDENT IN AN ORGANIZED HEALTH CARE EDUCATION/TRAINING PROGRAM

## 2024-05-17 PROCEDURE — 96375 TX/PRO/DX INJ NEW DRUG ADDON: CPT

## 2024-05-17 PROCEDURE — 83880 ASSAY OF NATRIURETIC PEPTIDE: CPT

## 2024-05-17 PROCEDURE — 6370000000 HC RX 637 (ALT 250 FOR IP): Performed by: STUDENT IN AN ORGANIZED HEALTH CARE EDUCATION/TRAINING PROGRAM

## 2024-05-17 PROCEDURE — 96374 THER/PROPH/DIAG INJ IV PUSH: CPT

## 2024-05-17 PROCEDURE — 83605 ASSAY OF LACTIC ACID: CPT

## 2024-05-17 PROCEDURE — 85025 COMPLETE CBC W/AUTO DIFF WBC: CPT

## 2024-05-17 PROCEDURE — 87040 BLOOD CULTURE FOR BACTERIA: CPT

## 2024-05-17 RX ORDER — FUROSEMIDE 10 MG/ML
40 INJECTION INTRAMUSCULAR; INTRAVENOUS ONCE
Status: COMPLETED | OUTPATIENT
Start: 2024-05-17 | End: 2024-05-17

## 2024-05-17 RX ORDER — 0.9 % SODIUM CHLORIDE 0.9 %
30 INTRAVENOUS SOLUTION INTRAVENOUS ONCE
Status: COMPLETED | OUTPATIENT
Start: 2024-05-17 | End: 2024-05-17

## 2024-05-17 RX ORDER — VANCOMYCIN 2 G/400ML
2000 INJECTION, SOLUTION INTRAVENOUS ONCE
Status: COMPLETED | OUTPATIENT
Start: 2024-05-17 | End: 2024-05-18

## 2024-05-17 RX ADMIN — SODIUM ZIRCONIUM CYCLOSILICATE 10 G: 10 POWDER, FOR SUSPENSION ORAL at 22:04

## 2024-05-17 RX ADMIN — WATER 1000 MG: 1 INJECTION INTRAMUSCULAR; INTRAVENOUS; SUBCUTANEOUS at 20:49

## 2024-05-17 RX ADMIN — SODIUM CHLORIDE 2466 ML: 9 INJECTION, SOLUTION INTRAVENOUS at 20:46

## 2024-05-17 RX ADMIN — VANCOMYCIN 2000 MG: 2 INJECTION, SOLUTION INTRAVENOUS at 22:02

## 2024-05-17 RX ADMIN — FUROSEMIDE 40 MG: 10 INJECTION, SOLUTION INTRAMUSCULAR; INTRAVENOUS at 20:48

## 2024-05-17 RX ADMIN — AZITHROMYCIN MONOHYDRATE 500 MG: 500 INJECTION, POWDER, LYOPHILIZED, FOR SOLUTION INTRAVENOUS at 20:57

## 2024-05-17 ASSESSMENT — PAIN SCALES - GENERAL: PAINLEVEL_OUTOF10: 0

## 2024-05-17 NOTE — ED PROVIDER NOTES
Notified of multiple positive blood cultures by nursing staff this morning.  Recommended that the patient be called back urgently and notified of bacteremia.  In review of results, the patient does have a leukocytosis with lactic acidosis and pneumonia as well as a UTI.  Patient will need to be considered for admission and screened for sepsis again.     Jose Manuel Lopez MD  05/17/24 0777

## 2024-05-17 NOTE — ED NOTES
5/17/2024@10:16 Attempted to call patient and instruct him to return to ED for admission.  No voicemail set up.  Message left with patients first contact number Sharon Marin [phone]

## 2024-05-17 NOTE — ED NOTES
05/16/2024 Yasir from [facility] calls stating \"4 bottles positive\" for Staph Aureus. EMD informed. [id number]

## 2024-05-17 NOTE — ED NOTES
Pt to ED via [facility]. Per EMS report, pt was recently dx with pneumonia, received a call today stating blood culture results were positive and he needed to return to the ED.Upon ED arrival, pt awake and lethargic, c/o all over body aches and sob. Hx of IV drug use, unable to tell RN last time he used but states he did not use any today.

## 2024-05-17 NOTE — ED NOTES
05/17/2024 @ 1243  Spoke with patients significant other Sharon Marin and Romeo and informed them that he needs to go to [facility] to be admitted.  Patient verbalized understanding and agreement and stated he would go to [facility].  Sharon Marin stated she would take him there.

## 2024-05-17 NOTE — ED NOTES
Attempted to call pt and instruct him to return to ER for admission and further tx. Phone number to [facility] that is not set up.  Message left with first contact number Sharon Marin.

## 2024-05-18 ENCOUNTER — HOSPITAL ENCOUNTER (INPATIENT)
Age: 44
LOS: 8 days | Discharge: NURSING, REHAB OR LONG-TERM CARE | DRG: 720 | End: 2024-05-26
Attending: FAMILY MEDICINE | Admitting: INTERNAL MEDICINE
Payer: COMMERCIAL

## 2024-05-18 ENCOUNTER — APPOINTMENT (OUTPATIENT)
Age: 44
DRG: 720 | End: 2024-05-18
Attending: HOSPITALIST
Payer: COMMERCIAL

## 2024-05-18 VITALS
WEIGHT: 169 LBS | HEIGHT: 74 IN | SYSTOLIC BLOOD PRESSURE: 112 MMHG | OXYGEN SATURATION: 95 % | RESPIRATION RATE: 17 BRPM | HEART RATE: 106 BPM | BODY MASS INDEX: 21.69 KG/M2 | TEMPERATURE: 98.8 F | DIASTOLIC BLOOD PRESSURE: 60 MMHG

## 2024-05-18 DIAGNOSIS — R78.81 BACTEREMIA: Primary | ICD-10-CM

## 2024-05-18 PROBLEM — A41.9 SEPSIS (HCC): Status: ACTIVE | Noted: 2024-05-18

## 2024-05-18 LAB
ANION GAP SERPL CALCULATED.3IONS-SCNC: 12 MMOL/L (ref 3–16)
BACTERIA BLD CULT ORG #2: ABNORMAL
BACTERIA BLD CULT ORG #2: NORMAL
BACTERIA BLD CULT: ABNORMAL
BACTERIA BLD CULT: ABNORMAL
BACTERIA BLD CULT: NORMAL
BASOPHILS # BLD: 0 K/UL (ref 0–0.2)
BASOPHILS NFR BLD: 0 %
BUN SERPL-MCNC: 42 MG/DL (ref 7–20)
CALCIUM SERPL-MCNC: 8.2 MG/DL (ref 8.3–10.6)
CHLORIDE SERPL-SCNC: 101 MMOL/L (ref 99–110)
CO2 SERPL-SCNC: 23 MMOL/L (ref 21–32)
CREAT SERPL-MCNC: 0.8 MG/DL (ref 0.9–1.3)
CRP SERPL-MCNC: 63.9 MG/L (ref 0–5.1)
DEPRECATED RDW RBC AUTO: 13.8 % (ref 12.4–15.4)
ECHO AO ROOT DIAM: 3.4 CM
ECHO AO ROOT INDEX: 1.68 CM/M2
ECHO AV AREA PEAK VELOCITY: 2.5 CM2
ECHO AV AREA VTI: 2.1 CM2
ECHO AV AREA/BSA PEAK VELOCITY: 1.2 CM2/M2
ECHO AV AREA/BSA VTI: 1 CM2/M2
ECHO AV MEAN GRADIENT: 8 MMHG
ECHO AV MEAN VELOCITY: 1.4 M/S
ECHO AV PEAK GRADIENT: 12 MMHG
ECHO AV PEAK VELOCITY: 1.8 M/S
ECHO AV VELOCITY RATIO: 0.61
ECHO AV VTI: 30.6 CM
ECHO BSA: 2 M2
ECHO LA AREA 2C: 17.1 CM2
ECHO LA AREA 4C: 15.2 CM2
ECHO LA MAJOR AXIS: 4.3 CM
ECHO LA MINOR AXIS: 4.6 CM
ECHO LA VOL BP: 47 ML (ref 18–58)
ECHO LA VOL MOD A2C: 51 ML (ref 18–58)
ECHO LA VOL MOD A4C: 42 ML (ref 18–58)
ECHO LA VOL/BSA BIPLANE: 23 ML/M2 (ref 16–34)
ECHO LA VOLUME INDEX MOD A2C: 25 ML/M2 (ref 16–34)
ECHO LA VOLUME INDEX MOD A4C: 21 ML/M2 (ref 16–34)
ECHO LV E' LATERAL VELOCITY: 19 CM/S
ECHO LV E' SEPTAL VELOCITY: 13 CM/S
ECHO LV FRACTIONAL SHORTENING: 29 % (ref 28–44)
ECHO LV INTERNAL DIMENSION DIASTOLE INDEX: 2.43 CM/M2
ECHO LV INTERNAL DIMENSION DIASTOLIC: 4.9 CM (ref 4.2–5.9)
ECHO LV INTERNAL DIMENSION SYSTOLIC INDEX: 1.73 CM/M2
ECHO LV INTERNAL DIMENSION SYSTOLIC: 3.5 CM
ECHO LV IVSD: 0.7 CM (ref 0.6–1)
ECHO LV MASS 2D: 120.8 G (ref 88–224)
ECHO LV MASS INDEX 2D: 59.8 G/M2 (ref 49–115)
ECHO LV POSTERIOR WALL DIASTOLIC: 0.8 CM (ref 0.6–1)
ECHO LV RELATIVE WALL THICKNESS RATIO: 0.33
ECHO LVOT AREA: 3.8 CM2
ECHO LVOT AV VTI INDEX: 0.55
ECHO LVOT DIAM: 2.2 CM
ECHO LVOT MEAN GRADIENT: 3 MMHG
ECHO LVOT PEAK GRADIENT: 5 MMHG
ECHO LVOT PEAK VELOCITY: 1.1 M/S
ECHO LVOT STROKE VOLUME INDEX: 31.6 ML/M2
ECHO LVOT SV: 63.8 ML
ECHO LVOT VTI: 16.8 CM
ECHO MV A VELOCITY: 0.62 M/S
ECHO MV AREA VTI: 5.6 CM2
ECHO MV E DECELERATION TIME (DT): 197 MS
ECHO MV E VELOCITY: 0.59 M/S
ECHO MV E/A RATIO: 0.95
ECHO MV E/E' LATERAL: 3.11
ECHO MV E/E' RATIO (AVERAGED): 3.82
ECHO MV E/E' SEPTAL: 4.54
ECHO MV LVOT VTI INDEX: 0.68
ECHO MV MAX VELOCITY: 0.7 M/S
ECHO MV MEAN GRADIENT: 1 MMHG
ECHO MV MEAN VELOCITY: 0.5 M/S
ECHO MV PEAK GRADIENT: 2 MMHG
ECHO MV VTI: 11.4 CM
ECHO RA AREA 4C: 19.9 CM2
ECHO RA END SYSTOLIC VOLUME APICAL 4 CHAMBER INDEX BSA: 31 ML/M2
ECHO RA VOLUME: 63 ML
ECHO RV BASAL DIMENSION: 3.6 CM
ECHO RV FREE WALL PEAK S': 20 CM/S
ECHO RV MID DIMENSION: 2.8 CM
ECHO RV TAPSE: 2.6 CM (ref 1.7–?)
ECHO TV REGURGITANT MAX VELOCITY: 2.1 M/S
ECHO TV REGURGITANT PEAK GRADIENT: 18 MMHG
EKG ATRIAL RATE: 87 BPM
EKG DIAGNOSIS: NORMAL
EKG P AXIS: 71 DEGREES
EKG P-R INTERVAL: 130 MS
EKG Q-T INTERVAL: 384 MS
EKG QRS DURATION: 100 MS
EKG QTC CALCULATION (BAZETT): 462 MS
EKG R AXIS: 70 DEGREES
EKG T AXIS: 37 DEGREES
EKG VENTRICULAR RATE: 87 BPM
EOSINOPHIL # BLD: 0 K/UL (ref 0–0.6)
EOSINOPHIL NFR BLD: 0 %
ERYTHROCYTE [SEDIMENTATION RATE] IN BLOOD BY WESTERGREN METHOD: 21 MM/HR (ref 0–15)
GFR SERPLBLD CREATININE-BSD FMLA CKD-EPI: >90 ML/MIN/{1.73_M2}
GLUCOSE SERPL-MCNC: 94 MG/DL (ref 70–99)
HCT VFR BLD AUTO: 42.5 % (ref 40.5–52.5)
HGB BLD-MCNC: 14.8 G/DL (ref 13.5–17.5)
LYMPHOCYTES # BLD: 0.9 K/UL (ref 1–5.1)
LYMPHOCYTES NFR BLD: 4 %
MCH RBC QN AUTO: 30.2 PG (ref 26–34)
MCHC RBC AUTO-ENTMCNC: 34.7 G/DL (ref 31–36)
MCV RBC AUTO: 87 FL (ref 80–100)
MONOCYTES # BLD: 1 K/UL (ref 0–1.3)
MONOCYTES NFR BLD: 6 %
MYELOCYTES NFR BLD MANUAL: 0 %
NEUTROPHILS # BLD: 15.5 K/UL (ref 1.7–7.7)
NEUTROPHILS NFR BLD: 88 %
NEUTS BAND NFR BLD MANUAL: 1 % (ref 0–7)
NEUTS VAC BLD QL SMEAR: PRESENT
ORGANISM: ABNORMAL
PLATELET # BLD AUTO: 73 K/UL (ref 135–450)
PMV BLD AUTO: 9.3 FL (ref 5–10.5)
POTASSIUM SERPL-SCNC: 5.1 MMOL/L (ref 3.5–5.1)
RBC # BLD AUTO: 4.89 M/UL (ref 4.2–5.9)
RBC MORPH BLD: NORMAL
S PYO THROAT QL CULT: NORMAL
SODIUM SERPL-SCNC: 136 MMOL/L (ref 136–145)
TOXIC GRANULES BLD QL SMEAR: PRESENT
VARIANT LYMPHS NFR BLD MANUAL: 1 % (ref 0–6)
WBC # BLD AUTO: 17.4 K/UL (ref 4–11)

## 2024-05-18 PROCEDURE — 6370000000 HC RX 637 (ALT 250 FOR IP): Performed by: STUDENT IN AN ORGANIZED HEALTH CARE EDUCATION/TRAINING PROGRAM

## 2024-05-18 PROCEDURE — 6360000002 HC RX W HCPCS: Performed by: HOSPITALIST

## 2024-05-18 PROCEDURE — 85025 COMPLETE CBC W/AUTO DIFF WBC: CPT

## 2024-05-18 PROCEDURE — 86140 C-REACTIVE PROTEIN: CPT

## 2024-05-18 PROCEDURE — 6360000002 HC RX W HCPCS

## 2024-05-18 PROCEDURE — 94760 N-INVAS EAR/PLS OXIMETRY 1: CPT

## 2024-05-18 PROCEDURE — 93306 TTE W/DOPPLER COMPLETE: CPT | Performed by: STUDENT IN AN ORGANIZED HEALTH CARE EDUCATION/TRAINING PROGRAM

## 2024-05-18 PROCEDURE — 6360000002 HC RX W HCPCS: Performed by: INTERNAL MEDICINE

## 2024-05-18 PROCEDURE — 80048 BASIC METABOLIC PNL TOTAL CA: CPT

## 2024-05-18 PROCEDURE — 36415 COLL VENOUS BLD VENIPUNCTURE: CPT

## 2024-05-18 PROCEDURE — 2580000003 HC RX 258

## 2024-05-18 PROCEDURE — 94640 AIRWAY INHALATION TREATMENT: CPT

## 2024-05-18 PROCEDURE — 86701 HIV-1ANTIBODY: CPT

## 2024-05-18 PROCEDURE — 99223 1ST HOSP IP/OBS HIGH 75: CPT | Performed by: INTERNAL MEDICINE

## 2024-05-18 PROCEDURE — 93010 ELECTROCARDIOGRAM REPORT: CPT | Performed by: INTERNAL MEDICINE

## 2024-05-18 PROCEDURE — 87390 HIV-1 AG IA: CPT

## 2024-05-18 PROCEDURE — 6370000000 HC RX 637 (ALT 250 FOR IP): Performed by: HOSPITALIST

## 2024-05-18 PROCEDURE — 86702 HIV-2 ANTIBODY: CPT

## 2024-05-18 PROCEDURE — 85652 RBC SED RATE AUTOMATED: CPT

## 2024-05-18 PROCEDURE — 87040 BLOOD CULTURE FOR BACTERIA: CPT

## 2024-05-18 PROCEDURE — 2060000000 HC ICU INTERMEDIATE R&B

## 2024-05-18 PROCEDURE — 6360000002 HC RX W HCPCS: Performed by: STUDENT IN AN ORGANIZED HEALTH CARE EDUCATION/TRAINING PROGRAM

## 2024-05-18 PROCEDURE — 93306 TTE W/DOPPLER COMPLETE: CPT

## 2024-05-18 PROCEDURE — 2580000003 HC RX 258: Performed by: HOSPITALIST

## 2024-05-18 PROCEDURE — 80074 ACUTE HEPATITIS PANEL: CPT

## 2024-05-18 RX ORDER — ONDANSETRON 4 MG/1
4 TABLET, ORALLY DISINTEGRATING ORAL EVERY 8 HOURS PRN
Status: DISCONTINUED | OUTPATIENT
Start: 2024-05-18 | End: 2024-05-27 | Stop reason: HOSPADM

## 2024-05-18 RX ORDER — METHOCARBAMOL 750 MG/1
750 TABLET, FILM COATED ORAL EVERY 6 HOURS PRN
Status: DISCONTINUED | OUTPATIENT
Start: 2024-05-18 | End: 2024-05-27 | Stop reason: HOSPADM

## 2024-05-18 RX ORDER — ALBUTEROL SULFATE 2.5 MG/3ML
SOLUTION RESPIRATORY (INHALATION)
Status: COMPLETED
Start: 2024-05-18 | End: 2024-05-20

## 2024-05-18 RX ORDER — POTASSIUM CHLORIDE 20 MEQ/1
40 TABLET, EXTENDED RELEASE ORAL PRN
Status: DISCONTINUED | OUTPATIENT
Start: 2024-05-18 | End: 2024-05-27 | Stop reason: HOSPADM

## 2024-05-18 RX ORDER — ONDANSETRON 2 MG/ML
4 INJECTION INTRAMUSCULAR; INTRAVENOUS EVERY 6 HOURS PRN
Status: DISCONTINUED | OUTPATIENT
Start: 2024-05-18 | End: 2024-05-27 | Stop reason: HOSPADM

## 2024-05-18 RX ORDER — POLYETHYLENE GLYCOL 3350 17 G/17G
17 POWDER, FOR SOLUTION ORAL DAILY PRN
Status: DISCONTINUED | OUTPATIENT
Start: 2024-05-18 | End: 2024-05-27 | Stop reason: HOSPADM

## 2024-05-18 RX ORDER — VANCOMYCIN 1.75 G/350ML
1250 INJECTION, SOLUTION INTRAVENOUS EVERY 8 HOURS
Status: DISCONTINUED | OUTPATIENT
Start: 2024-05-18 | End: 2024-05-18 | Stop reason: ALTCHOICE

## 2024-05-18 RX ORDER — SODIUM CHLORIDE 9 MG/ML
INJECTION, SOLUTION INTRAVENOUS CONTINUOUS
Status: DISCONTINUED | OUTPATIENT
Start: 2024-05-18 | End: 2024-05-18

## 2024-05-18 RX ORDER — ALBUTEROL SULFATE 2.5 MG/3ML
2.5 SOLUTION RESPIRATORY (INHALATION)
Status: DISCONTINUED | OUTPATIENT
Start: 2024-05-19 | End: 2024-05-22

## 2024-05-18 RX ORDER — SODIUM CHLORIDE 0.9 % (FLUSH) 0.9 %
5-40 SYRINGE (ML) INJECTION PRN
Status: DISCONTINUED | OUTPATIENT
Start: 2024-05-18 | End: 2024-05-18 | Stop reason: HOSPADM

## 2024-05-18 RX ORDER — POLYETHYLENE GLYCOL 3350 17 G/17G
17 POWDER, FOR SOLUTION ORAL DAILY PRN
Status: DISCONTINUED | OUTPATIENT
Start: 2024-05-18 | End: 2024-05-18 | Stop reason: HOSPADM

## 2024-05-18 RX ORDER — BUPRENORPHINE AND NALOXONE 2; .5 MG/1; MG/1
2 FILM, SOLUBLE BUCCAL; SUBLINGUAL PRN
Status: DISCONTINUED | OUTPATIENT
Start: 2024-05-18 | End: 2024-05-27 | Stop reason: HOSPADM

## 2024-05-18 RX ORDER — MAGNESIUM SULFATE IN WATER 40 MG/ML
2000 INJECTION, SOLUTION INTRAVENOUS PRN
Status: DISCONTINUED | OUTPATIENT
Start: 2024-05-18 | End: 2024-05-27 | Stop reason: HOSPADM

## 2024-05-18 RX ORDER — DICYCLOMINE HCL 20 MG
20 TABLET ORAL EVERY 6 HOURS PRN
Status: DISCONTINUED | OUTPATIENT
Start: 2024-05-18 | End: 2024-05-18 | Stop reason: HOSPADM

## 2024-05-18 RX ORDER — HYDROXYZINE PAMOATE 25 MG/1
50 CAPSULE ORAL EVERY 8 HOURS PRN
Status: DISCONTINUED | OUTPATIENT
Start: 2024-05-18 | End: 2024-05-18 | Stop reason: HOSPADM

## 2024-05-18 RX ORDER — CLINDAMYCIN PHOSPHATE 900 MG/50ML
900 INJECTION, SOLUTION INTRAVENOUS EVERY 8 HOURS
Status: DISCONTINUED | OUTPATIENT
Start: 2024-05-18 | End: 2024-05-18 | Stop reason: HOSPADM

## 2024-05-18 RX ORDER — SODIUM CHLORIDE 0.9 % (FLUSH) 0.9 %
5-40 SYRINGE (ML) INJECTION EVERY 12 HOURS SCHEDULED
Status: DISCONTINUED | OUTPATIENT
Start: 2024-05-18 | End: 2024-05-18 | Stop reason: HOSPADM

## 2024-05-18 RX ORDER — SODIUM CHLORIDE 9 MG/ML
INJECTION, SOLUTION INTRAVENOUS CONTINUOUS
Status: ACTIVE | OUTPATIENT
Start: 2024-05-18 | End: 2024-05-19

## 2024-05-18 RX ORDER — SODIUM CHLORIDE 0.9 % (FLUSH) 0.9 %
5-40 SYRINGE (ML) INJECTION PRN
Status: DISCONTINUED | OUTPATIENT
Start: 2024-05-18 | End: 2024-05-27 | Stop reason: HOSPADM

## 2024-05-18 RX ORDER — DIPHENHYDRAMINE HCL 25 MG
25 TABLET ORAL NIGHTLY PRN
Status: DISCONTINUED | OUTPATIENT
Start: 2024-05-18 | End: 2024-05-27 | Stop reason: HOSPADM

## 2024-05-18 RX ORDER — SODIUM CHLORIDE 0.9 % (FLUSH) 0.9 %
5-40 SYRINGE (ML) INJECTION EVERY 12 HOURS SCHEDULED
Status: DISCONTINUED | OUTPATIENT
Start: 2024-05-18 | End: 2024-05-27 | Stop reason: HOSPADM

## 2024-05-18 RX ORDER — ONDANSETRON 2 MG/ML
4 INJECTION INTRAMUSCULAR; INTRAVENOUS EVERY 6 HOURS PRN
Status: DISCONTINUED | OUTPATIENT
Start: 2024-05-18 | End: 2024-05-18 | Stop reason: HOSPADM

## 2024-05-18 RX ORDER — ALBUTEROL SULFATE 2.5 MG/3ML
2.5 SOLUTION RESPIRATORY (INHALATION) EVERY 4 HOURS PRN
Status: DISCONTINUED | OUTPATIENT
Start: 2024-05-18 | End: 2024-05-18

## 2024-05-18 RX ORDER — ACETAMINOPHEN 650 MG/1
650 SUPPOSITORY RECTAL EVERY 6 HOURS PRN
Status: DISCONTINUED | OUTPATIENT
Start: 2024-05-18 | End: 2024-05-27 | Stop reason: HOSPADM

## 2024-05-18 RX ORDER — ENOXAPARIN SODIUM 100 MG/ML
40 INJECTION SUBCUTANEOUS DAILY
Status: DISCONTINUED | OUTPATIENT
Start: 2024-05-18 | End: 2024-05-18 | Stop reason: HOSPADM

## 2024-05-18 RX ORDER — SODIUM CHLORIDE 9 MG/ML
INJECTION, SOLUTION INTRAVENOUS PRN
Status: DISCONTINUED | OUTPATIENT
Start: 2024-05-18 | End: 2024-05-27 | Stop reason: HOSPADM

## 2024-05-18 RX ORDER — SODIUM CHLORIDE 9 MG/ML
INJECTION, SOLUTION INTRAVENOUS PRN
Status: DISCONTINUED | OUTPATIENT
Start: 2024-05-18 | End: 2024-05-18 | Stop reason: HOSPADM

## 2024-05-18 RX ORDER — GABAPENTIN 300 MG/1
300 CAPSULE ORAL EVERY 8 HOURS PRN
Status: DISCONTINUED | OUTPATIENT
Start: 2024-05-18 | End: 2024-05-18 | Stop reason: HOSPADM

## 2024-05-18 RX ORDER — ACETAMINOPHEN 325 MG/1
650 TABLET ORAL EVERY 6 HOURS PRN
Status: DISCONTINUED | OUTPATIENT
Start: 2024-05-18 | End: 2024-05-27 | Stop reason: HOSPADM

## 2024-05-18 RX ORDER — LOPERAMIDE HYDROCHLORIDE 2 MG/1
2 CAPSULE ORAL 4 TIMES DAILY PRN
Status: DISCONTINUED | OUTPATIENT
Start: 2024-05-18 | End: 2024-05-18 | Stop reason: HOSPADM

## 2024-05-18 RX ORDER — METHOCARBAMOL 750 MG/1
750 TABLET, FILM COATED ORAL EVERY 6 HOURS PRN
Status: DISCONTINUED | OUTPATIENT
Start: 2024-05-18 | End: 2024-05-18 | Stop reason: HOSPADM

## 2024-05-18 RX ORDER — ACETAMINOPHEN 325 MG/1
650 TABLET ORAL EVERY 6 HOURS PRN
Status: DISCONTINUED | OUTPATIENT
Start: 2024-05-18 | End: 2024-05-18 | Stop reason: HOSPADM

## 2024-05-18 RX ORDER — BUPRENORPHINE HYDROCHLORIDE AND NALOXONE HYDROCHLORIDE DIHYDRATE 2; .5 MG/1; MG/1
2 TABLET SUBLINGUAL PRN
Status: DISCONTINUED | OUTPATIENT
Start: 2024-05-18 | End: 2024-05-18 | Stop reason: HOSPADM

## 2024-05-18 RX ORDER — IBUPROFEN 400 MG/1
400 TABLET ORAL EVERY 4 HOURS PRN
Status: DISCONTINUED | OUTPATIENT
Start: 2024-05-18 | End: 2024-05-18 | Stop reason: HOSPADM

## 2024-05-18 RX ORDER — PROMETHAZINE HYDROCHLORIDE 25 MG/1
25 TABLET ORAL EVERY 6 HOURS PRN
Status: DISCONTINUED | OUTPATIENT
Start: 2024-05-18 | End: 2024-05-18 | Stop reason: HOSPADM

## 2024-05-18 RX ORDER — ALBUTEROL SULFATE 2.5 MG/3ML
2.5 SOLUTION RESPIRATORY (INHALATION)
Status: DISCONTINUED | OUTPATIENT
Start: 2024-05-18 | End: 2024-05-18 | Stop reason: HOSPADM

## 2024-05-18 RX ORDER — CLONIDINE HYDROCHLORIDE 0.1 MG/1
0.1 TABLET ORAL EVERY 6 HOURS PRN
Status: DISCONTINUED | OUTPATIENT
Start: 2024-05-18 | End: 2024-05-18 | Stop reason: HOSPADM

## 2024-05-18 RX ORDER — ENOXAPARIN SODIUM 100 MG/ML
40 INJECTION SUBCUTANEOUS DAILY
Status: DISCONTINUED | OUTPATIENT
Start: 2024-05-19 | End: 2024-05-27 | Stop reason: HOSPADM

## 2024-05-18 RX ORDER — ONDANSETRON 4 MG/1
4 TABLET, ORALLY DISINTEGRATING ORAL EVERY 8 HOURS PRN
Status: DISCONTINUED | OUTPATIENT
Start: 2024-05-18 | End: 2024-05-18 | Stop reason: HOSPADM

## 2024-05-18 RX ORDER — ALBUTEROL SULFATE 90 UG/1
2 AEROSOL, METERED RESPIRATORY (INHALATION) EVERY 4 HOURS PRN
Status: DISCONTINUED | OUTPATIENT
Start: 2024-05-18 | End: 2024-05-27 | Stop reason: HOSPADM

## 2024-05-18 RX ORDER — POTASSIUM CHLORIDE 7.45 MG/ML
10 INJECTION INTRAVENOUS PRN
Status: DISCONTINUED | OUTPATIENT
Start: 2024-05-18 | End: 2024-05-27 | Stop reason: HOSPADM

## 2024-05-18 RX ORDER — ACETAMINOPHEN 650 MG/1
650 SUPPOSITORY RECTAL EVERY 6 HOURS PRN
Status: DISCONTINUED | OUTPATIENT
Start: 2024-05-18 | End: 2024-05-18 | Stop reason: HOSPADM

## 2024-05-18 RX ADMIN — ALBUTEROL SULFATE 2.5 MG: 2.5 SOLUTION RESPIRATORY (INHALATION) at 22:47

## 2024-05-18 RX ADMIN — VANCOMYCIN 1250 MG: 1.75 INJECTION, SOLUTION INTRAVENOUS at 05:50

## 2024-05-18 RX ADMIN — CEFAZOLIN 2000 MG: 2 INJECTION, POWDER, FOR SOLUTION INTRAVENOUS at 20:10

## 2024-05-18 RX ADMIN — BUPRENORPHINE HYDROCHLORIDE AND NALOXONE HYDROCHLORIDE DIHYDRATE 2 TABLET: 2; .5 TABLET SUBLINGUAL at 10:38

## 2024-05-18 RX ADMIN — SODIUM CHLORIDE 5 ML/HR: 9 INJECTION, SOLUTION INTRAVENOUS at 18:18

## 2024-05-18 RX ADMIN — Medication 10 ML: at 10:50

## 2024-05-18 RX ADMIN — CLINDAMYCIN PHOSPHATE 900 MG: 900 INJECTION, SOLUTION INTRAVENOUS at 18:19

## 2024-05-18 RX ADMIN — ACETAMINOPHEN 650 MG: 325 TABLET ORAL at 22:53

## 2024-05-18 RX ADMIN — CLONIDINE HYDROCHLORIDE 0.1 MG: 0.1 TABLET ORAL at 10:41

## 2024-05-18 RX ADMIN — SODIUM CHLORIDE: 9 INJECTION, SOLUTION INTRAVENOUS at 02:27

## 2024-05-18 RX ADMIN — BUPRENORPHINE HYDROCHLORIDE AND NALOXONE HYDROCHLORIDE DIHYDRATE 2 TABLET: 2; .5 TABLET SUBLINGUAL at 06:43

## 2024-05-18 RX ADMIN — CEFAZOLIN 2000 MG: 2 INJECTION, POWDER, FOR SOLUTION INTRAVENOUS at 11:31

## 2024-05-18 ASSESSMENT — PAIN DESCRIPTION - DESCRIPTORS
DESCRIPTORS: ACHING
DESCRIPTORS: ACHING

## 2024-05-18 ASSESSMENT — PAIN SCALES - GENERAL
PAINLEVEL_OUTOF10: 0
PAINLEVEL_OUTOF10: 10
PAINLEVEL_OUTOF10: 0
PAINLEVEL_OUTOF10: 10
PAINLEVEL_OUTOF10: 10

## 2024-05-18 ASSESSMENT — PAIN DESCRIPTION - LOCATION
LOCATION: GENERALIZED
LOCATION: GENERALIZED

## 2024-05-18 NOTE — CONSULTS
Clinical Pharmacy Note  Vancomycin Consult    Pharmacy consult received for one-time dose of vancomycin in the Emergency Department per Dr. Gonzalez.    Ht Readings from Last 1 Encounters:   05/17/24 1.88 m (6' 2\")        Wt Readings from Last 1 Encounters:   05/17/24 (!) 165 kg (363 lb 12.1 oz)         Assessment/Plan:  Vancomycin 2000 mg x 1 in ED.  If vancomycin is to continue on admission and pharmacy is to manage dosing, please re-consult with admission orders.        Carie Saavedra RPH, PharmD 5/17/2024 8:32 PM

## 2024-05-18 NOTE — CONSULTS
Infectious Diseases Inpatient Consult Note      Reason for Consult:  Sepsis, Staph aureus Bacteremia, IVDA    Requesting Physician:  Dr. Alfonso     Primary Care Physician:  No primary care provider on file.    History Obtained From:  Epic      CHIEF COMPLAINT:     Chief Complaint   Patient presents with    Shortness of Breath         HISTORY OF PRESENT ILLNESS:  43 y.o. man with a history of polysubstance abuse, IV drug abuse admitted to hospital secondary to shortness of breath and blood culture positive for Staph aureus.  Patient presented to the ED on 5/15/24 due to shortness of breath not feeling well he had blood cultures drawn he was sent home on oral Augmentin patient was called back after culture positive for Staph aureus MSSA.  Patient admitted to injecting into his lower extremities UDS positive for amphetamine, cocaine, fentanyl.  He did have elevated lactic acid up to 2.6 on his presentation to ED on 5/15/24.  Current labs indicate potassium 5.6 sodium 135 LFT indicate alkaline phosphatase 151 ALT 46 AST 7.9 WBC 20 point 7 repeat blood culture in process no recorded fevers chest x-ray with airspace infiltrates both lower lobes given the ongoing sepsis with bacteremia we are consulted for recommendation. He is not able  to give any history due to influence of Drugs and inebrieated condition           Past Medical History:    Past Medical History:   Diagnosis Date    Opiate abuse, episodic (HCC)        Past Surgical History:    Past Surgical History:   Procedure Laterality Date    ABDOMEN SURGERY      spleen    DENTAL SURGERY      FRACTURE SURGERY      left ankle       Current Medications:    No outpatient medications have been marked as taking for the 5/17/24 encounter (Hospital Encounter).       Allergies:  Patient has no known allergies.    Immunizations :   There is no immunization history on file for this patient.      Social History:     Social History     Tobacco Use    Smoking status: Every  Day     Current packs/day: 0.50     Types: Cigarettes    Smokeless tobacco: Never    Tobacco comments:     counseled on tobaccoexposure avoidance   Substance Use Topics    Alcohol use: No    Drug use: Yes     Types: IV, Opiates      Comment: daily     Social History     Tobacco Use   Smoking Status Every Day    Current packs/day: 0.50    Types: Cigarettes   Smokeless Tobacco Never   Tobacco Comments    counseled on tobaccoexposure avoidance      Family history : no DVT no COPD        REVIEW OF SYSTEMS:      Not possible due to change in mentation   PHYSICAL EXAM:      Vitals:    /83   Pulse (!) 111   Temp 98.7 °F (37.1 °C) (Oral)   Resp 20   Ht 1.88 m (6' 2\")   Wt 76.7 kg (169 lb)   SpO2 96%   BMI 21.70 kg/m²     General Appearance: lethargic and opens eyes during conversation  in SOME acute distress, no pallor, no icterus   Skin: warm and dry, no rash or erythema  Head: normocephalic and atraumatic  Eyes: pupils equal, round, and reactive to light, conjunctivae normal  ENT: tympanic membrane, external ear and ear canal normal bilaterally, nose without deformity, nasal mucosa and turbinates normal without polyps  Neck: supple and non-tender without mass, no thyromegaly  no cervical lymphadenopathy  Pulmonary/Chest: bI BASAL CREPTS+ bilaterally- no wheezes, rales or rhonchi, normal air movement, no respiratory distress  Cardiovascular: Tachy+ , normal S1 and S2, esm+  murmurs, rubs, clicks, or gallops, no carotid bruits  Abdomen: soft, non-tender, non-distended, normal bowel sounds, no masses or organomegaly  Extremities: no cyanosis, clubbing or edema  Musculoskeletal: normal range of motion, no joint swelling, deformity or tenderness  Integumentary: No rashes, no abnormal skin lesions, no petechiae  Neurologic: reflexes normal and symmetric, no cranial nerve deficit   Lines: iv  Needle tracks+    DATA:    CBC:   Lab Results   Component Value Date    WBC 17.4 (H) 05/18/2024    HGB 14.8 05/18/2024    HCT

## 2024-05-18 NOTE — PLAN OF CARE
Problem: Discharge Planning  Goal: Discharge to home or other facility with appropriate resources  Outcome: Progressing  Flowsheets (Taken 5/18/2024 0945)  Discharge to home or other facility with appropriate resources:   Identify barriers to discharge with patient and caregiver   Arrange for needed discharge resources and transportation as appropriate   Identify discharge learning needs (meds, wound care, etc)     Problem: Pain  Goal: Verbalizes/displays adequate comfort level or baseline comfort level  5/18/2024 1238 by Alla Grubbs RN  Outcome: Progressing     Problem: Safety - Adult  Goal: Free from fall injury  5/18/2024 1238 by Alla Grubbs, RN  Outcome: Progressing  Flowsheets (Taken 5/18/2024 0945)  Free From Fall Injury: Instruct family/caregiver on patient safety     Problem: Skin/Tissue Integrity  Goal: Absence of new skin breakdown  Description: 1.  Monitor for areas of redness and/or skin breakdown  2.  Assess vascular access sites hourly  3.  Every 4-6 hours minimum:  Change oxygen saturation probe site  4.  Every 4-6 hours:  If on nasal continuous positive airway pressure, respiratory therapy assess nares and determine need for appliance change or resting period.  5/18/2024 1238 by Alla Grubbs, RN  Outcome: Progressing     Problem: Chronic Conditions and Co-morbidities  Goal: Patient's chronic conditions and co-morbidity symptoms are monitored and maintained or improved  Outcome: Progressing     Problem: Skin/Tissue Integrity  Goal: Absence of new skin breakdown  Description: 1.  Monitor for areas of redness and/or skin breakdown  2.  Assess vascular access sites hourly  3.  Every 4-6 hours minimum:  Change oxygen saturation probe site  4.  Every 4-6 hours:  If on nasal continuous positive airway pressure, respiratory therapy assess nares and determine need for appliance change or resting period.  5/18/2024 1238 by Alla Grubbs, RN  Outcome: Progressing

## 2024-05-18 NOTE — ED PROVIDER NOTES
ventricular 87 TN interval 130  QTc 462, normal axis, no clinically significant ST segment elevation or depression, no significant T wave abnormality except for lead III with a mild T wave inversion    RADIOLOGY:   Non-plain film images such as CT, Ultrasound and MRI are read by the radiologist. Plainradiographic images are visualized and preliminarily interpreted by the  ED Provider with the belowfindings:        Interpretation per the Radiologist below, if available at the time of this note:    XR CHEST PORTABLE   Final Result   1.  Improving airspace infiltrates compatible with improving pneumonia.      2.  Left lower lobe atelectasis           XR CHEST PORTABLE    Result Date: 5/17/2024  EXAMINATION: ONE XRAY VIEW OF THE CHEST 5/17/2024 7:31 pm COMPARISON: Chest x-ray dated 15 May 2024 HISTORY: ORDERING SYSTEM PROVIDED HISTORY: sob TECHNOLOGIST PROVIDED HISTORY: Reason for exam:->sob Reason for Exam: sob FINDINGS: Stable appearance of infiltrates in the left upper lobe.  Resolution of infiltrates in the right lower lobe.  Platelike opacity in the left lower lobe likely representing atelectasis.  No pneumothorax or pleural effusion     1.  Improving airspace infiltrates compatible with improving pneumonia. 2.  Left lower lobe atelectasis     XR CHEST (2 VW)    Result Date: 5/15/2024  EXAMINATION: TWO XRAY VIEWS OF THE CHEST 5/15/2024 2:12 pm COMPARISON: None. HISTORY: ORDERING SYSTEM PROVIDED HISTORY: Cough, fever TECHNOLOGIST PROVIDED HISTORY: Reason for exam:->Cough, fever Reason for Exam: cough, fever, left sided chest pain FINDINGS: There is patchy bilateral pulmonary infiltration noted.  This is more prominent noted in the the left upper lung zone and left lung base.  The heart appears unremarkable.  Bony structures appear normal.     Patchy bilateral pulmonary infiltration consistent with pneumonia. Follow-up to resolution is recommended.      No results found.     LABS: (none if blank)  Labs  due to infectious organism, unspecified laterality, unspecified part of lung          DISPOSITION/PLAN   DISPOSITION Decision To Admit 05/17/2024 08:48:00 PM      OUTPATIENT FOLLOW UP THE PATIENT:  No follow-up provider specified.      Electronically Signed: David Gonzalez MD, 05/17/24, 8:59 PM    This report has been produced using speech recognition software and may contain errors related to that system including errors in grammar, punctuation, and spelling, as well as words and phrases that may be inappropriate. If there are any questions or concerns please feel free to contact the dictating provider for clarification.       David Gonzalez MD  05/17/24 0763

## 2024-05-18 NOTE — ED NOTES
Report called to PIETER Feliciano for Rm [id number]. Questions addressed and report accepted. Pt transport to be arranged.

## 2024-05-18 NOTE — ED NOTES
Second IV site initiated due to volume of fluid plus antibiotics that are needing infused. 2500 ml NS bolus is split between the sites, no other way to document. 2500 ml NS bolus is running on manually programmed pumps.

## 2024-05-18 NOTE — PLAN OF CARE
Problem: Skin/Tissue Integrity  Goal: Absence of new skin breakdown  Description: 1.  Monitor for areas of redness and/or skin breakdown  2.  Assess vascular access sites hourly  3.  Every 4-6 hours minimum:  Change oxygen saturation probe site  4.  Every 4-6 hours:  If on nasal continuous positive airway pressure, respiratory therapy assess nares and determine need for appliance change or resting period.  Outcome: Not Progressing     Problem: Skin/Tissue Integrity  Goal: Absence of new skin breakdown  Description: 1.  Monitor for areas of redness and/or skin breakdown  2.  Assess vascular access sites hourly  3.  Every 4-6 hours minimum:  Change oxygen saturation probe site  4.  Every 4-6 hours:  If on nasal continuous positive airway pressure, respiratory therapy assess nares and determine need for appliance change or resting period.  Outcome: Not Progressing

## 2024-05-18 NOTE — RESULT ENCOUNTER NOTE
Culture reviewed, please contact patient and inform them of the results and as noted in Dr. Lopez's response to this yesterday the patient should be called back to be reevaluated and possibly require admission.

## 2024-05-18 NOTE — H&P
V2.0  History and Physical      Name:  Romeo Handley /Age/Sex: 1980  (43 y.o. male)   MRN & CSN:  [id number] & [id number] Encounter Date/Time: 2024 9:03 PM EDT   Location:  16 PCP: No primary care provider on file.       Hospital Day: 1    Assessment and Plan:   Romeo Handley is a 43 y.o. male with a pmh of pmh of drug use who presents with    shortness of breath ; productive cough ; fevers and chills; and chills for last few days; diagnosed recently with acute UTI and pneumonia; and found to have bacteremia and with Staphylococcus aureus bacteremia    Hospital Problems             Last Modified POA    * (Principal) Staphylococcus aureus bacteremia 2024 Yes    Pneumonia in infectious disease 2024 Yes    UTI (urinary tract infection) 2024 Yes    Drug abuse (HCC) 2024 Yes       Plan:  Pneumonia, staph bacteremia-recently at the hospital and blood culture returned positive for Staph aureus blood culture.  Also urine culture was positive for Staph aureus.  Blood cultures were repeated on this presentation at the emergency department, follow.  Vancomycin; ceftriaxone and azithromycin ordered emergency department, continue same antibiotics for now.  With history of drug use and in the setting of bacteremia will get an echocardiogram.  With bilateral feet swelling will check duplex of bilateral legs.  Clinical monitoring for withdrawal.  Unclear what tracks patient has been using and when he still activities.  UTI-antibiotics as above.  Advanced care planning was discussed with patient for a total of 16 minutes.  Full code, DNR CC, DNR CCA, power of  and living will were discussed.  Patient elected to be full code.   Disposition:   Current Living situation: Unknown  Expected Disposition unclear at this time  Estimated D/C: At least 3 days    Diet Regular food   DVT Prophylaxis [x] Lovenox, []  Heparin, [] SCDs, [] Ambulation,  [] Eliquis, [] Xarelto, [] Coumadin   Code  Female       Medications:   Medications:    sodium chloride  30 mL/kg (Ideal) IntraVENous Once    azithromycin  500 mg IntraVENous Once    sodium zirconium cyclosilicate  10 g Oral Once    vancomycin  2,000 mg IntraVENous Once      Infusions:   PRN Meds:      Labs      CBC:   Recent Labs     05/15/24  1441 05/17/24  1904   WBC 13.9* 20.7*   HGB 15.8 14.2   PLT 71* 77*     BMP:    Recent Labs     05/15/24  1441 05/17/24  1904   * 135*   K 4.7 5.6*   CL 96* 101   CO2 23 22   BUN 44* 44*   CREATININE 1.2 0.7*   GLUCOSE 127* 122*     Hepatic:   Recent Labs     05/15/24  1441 05/17/24  1904   AST 70* 79*   ALT 51* 46*   BILITOT 2.1* 1.3*   ALKPHOS 160* 151*     Lipids: No results found for: \"CHOL\", \"HDL\", \"TRIG\"  Hemoglobin A1C: No results found for: \"LABA1C\"  TSH: No results found for: \"TSH\"  Troponin: No results found for: \"TROPONINT\"  Lactic Acid:   Recent Labs     05/15/24  1721 05/15/24  1830 05/17/24  1904   LACTA 3.6* 2.6* 2.0     BNP:   Recent Labs     05/17/24  2020   PROBNP 1,581*     UA:  Lab Results   Component Value Date/Time    NITRU POSITIVE 05/15/2024 04:32 PM    COLORU DARK YELLOW 05/15/2024 04:32 PM    PHUR 6.0 05/15/2024 04:32 PM    PHUR 6.0 05/15/2024 04:32 PM    WBCUA 21-50 05/15/2024 04:32 PM    RBCUA None seen 05/15/2024 04:32 PM    BACTERIA 2+ 05/15/2024 04:32 PM    CLARITYU CLOUDY 05/15/2024 04:32 PM    LEUKOCYTESUR SMALL 05/15/2024 04:32 PM    UROBILINOGEN 4.0 05/15/2024 04:32 PM    BILIRUBINUR SMALL 05/15/2024 04:32 PM    BLOODU LARGE 05/15/2024 04:32 PM    GLUCOSEU Negative 05/15/2024 04:32 PM    KETUA Negative 05/15/2024 04:32 PM    AMORPHOUS 2+ 05/15/2024 04:32 PM     Urine Cultures:   Lab Results   Component Value Date/Time    LABURIN >100,000 CFU/ml 05/15/2024 04:32 PM     Blood Cultures:   Lab Results   Component Value Date/Time    BC  05/15/2024 05:05 PM     Gram stain Aerobic bottle:  Gram positive cocci in clusters  resembling Staphylococcus  Information to follow  Gram stain

## 2024-05-18 NOTE — CONSULTS
Clinical Pharmacy Note  Vancomycin Consult    Romeo Handley is a 43 y.o. male ordered Vancomycin for Pneumonia/bacteremia; consult received from Dr. Hansen to manage therapy. Also receiving ceftriaxone and Zithromax.    Allergies:  Patient has no known allergies.     Temp max:  Temp (24hrs), Av.5 °F (36.9 °C), Min:98.4 °F (36.9 °C), Max:98.5 °F (36.9 °C)      Recent Labs     05/15/24  1441 24  1904   WBC 13.9* 20.7*       Recent Labs     05/15/24  1441 24  1904   BUN 44* 44*   CREATININE 1.2 0.7*         Intake/Output Summary (Last 24 hours) at 2024 0210  Last data filed at 2024 0104  Gross per 24 hour   Intake --   Output 4350 ml   Net -4350 ml       Culture Results:      Ht Readings from Last 1 Encounters:   24 1.88 m (6' 2\")        Wt Readings from Last 1 Encounters:   24 76 kg (167 lb 9.6 oz)         Estimated Creatinine Clearance: 145 mL/min (A) (based on SCr of 0.7 mg/dL (L)).    Assessment/Plan:  Day # 1 of vancomycin.  Vancomycin 1250 mg IV every 8 hours ordered.    Goal -600  Predicted AUC 24-48 hrs: 522   Predicted AUC steady state: 562    Thank you for the consult.   Pasha Rivera, AiramD

## 2024-05-18 NOTE — PLAN OF CARE
Updated by Dr. Pfeiffer of patient's echo results  Spoke with Dr. Oropeza at [facility] who accepted the patient in transfer for CT surgery consult  Discharge order placed    Norah Hills PA-C  5/18/2024   6:37 PM

## 2024-05-18 NOTE — RESULT ENCOUNTER NOTE
Patient's positive result has been appropriately evaluated by the provider pool.   Patient was unable to be reached over the phone.  A voicemail was left with the patient.  Will await a return phone call.  Will try to reach patient again tomorrow per protocol.

## 2024-05-18 NOTE — RESULT ENCOUNTER NOTE
Patient's positive result has been appropriately evaluated by the provider pool. Patient had positive blood cultures he was already notified go back to [facility]. Patient is currently admitted at [facility] in room 4271. Talked with his nurse Olga she states, they are aware of the positive blood cultures. Also informed patient's urine culture is resistant to augmentin. Dr. Marsh wanted to change medication to Levaquin 500 mg daily po x 7 days. Informed Olga to inform [facility] Hospitalist taking care of patient

## 2024-05-18 NOTE — PROGRESS NOTES
Hospitalist Progress Note      PCP: No primary care provider on file.    Date of Admission: 5/17/2024    Chief Complaint: Shortness of breath    Hospital Course: Luci Danielis a 43-year-old male with past medical history of polysubstance abuse who presented to the ED with chief complaint of worsening fever, chills, cough and shortness of breath.  Patient was seen previously in the ED on 5/15 for UTI and pneumonia.  He was discharged home on oral antibiotics, however, blood cultures did result positive for Staph aureus and he was advised to return to emergency department.  In the ED, patient met sepsis criteria with tachycardia and a white blood cell count of 20.7.  Repeat blood cultures were drawn.  He was started on broad-spectrum IV antibiotics with vancomycin, ceftriaxone and azithromycin.  He was also noted to have lower extremity swelling.  Echo and duplex of bilateral legs ordered.  ID was consulted on admission for assistance with antibiotic regimen.    Subjective: Patient seen, writhing in bed. States he has pain all over, and unable to specify any one location. States he just feels \"terrible\". He does not elaborate further on symptoms and becomes aggravated with questions stating \"we have already been through this\". Patient does endorse recent fentanyl and cocaine use.    Assessment/Plan:    Sepsis, POA  -criteria: HR, WBC  -may be originating from soft tissue source given hx IVDU  -s/p IVF, hold further given peripheral edema   -repeat blood cultures pending    -broad spectrum IV abx as below     Staph aureus bacteremia   Pneumonia  UTI  -started on rocephin, zithromax and vancomycin on admission  -pharmacy adjusted to ancef and zithromax  -ID consulted for further recommendations   -repeat blood cultures pending  -echo ordered in setting of known IVDU    Bilateral lower extremity edema  -echo pending as above  -venous dopplers pending   -IVF dc'd    Transaminitis  History of hepatitis C  -patient

## 2024-05-18 NOTE — FLOWSHEET NOTE
4 Eyes Skin Assessment     NAME:  Romeo Handley  YOB: 1980  MEDICAL RECORD NUMBER:  [id number]    The patient is being assessed for  Admission  Refused 4eyes to be completed.  I agree that at least one RN has performed a thorough Head to Toe Skin Assessment on the patient. ALL assessment sites listed below have been assessed.      Areas assessed by both nurses:    Pt refusing 4 eyes.         Does the Patient have a Wound?        Nilesh Prevention initiated by RN:  Wound Care Orders initiated by RN:     Pressure Injury (Stage 3,4, Unstageable, DTI, NWPT, and Complex wounds) if present, place Wound referral order by RN under :     New Ostomies, if present place, Ostomy referral order under :     Nurse 1 eSignature: Electronically signed by Jodie Reed RN on 5/18/24 at 3:07 AM EDT    **SHARE this note so that the co-signing nurse can place an eSignature**    Nurse 2 eSignature: Electronically signed by Zakiya Mandel RN on 5/18/24 at 5:02 AM EDT

## 2024-05-19 PROBLEM — D72.9 NEUTROPHILIA: Status: ACTIVE | Noted: 2024-05-19

## 2024-05-19 PROBLEM — R50.9 FEVER AND CHILLS: Status: ACTIVE | Noted: 2024-05-19

## 2024-05-19 PROBLEM — R78.81 BACTEREMIA: Status: ACTIVE | Noted: 2024-05-19

## 2024-05-19 PROBLEM — F19.10 INTRAVENOUS DRUG ABUSE (HCC): Status: ACTIVE | Noted: 2024-05-19

## 2024-05-19 PROBLEM — I33.0 ACUTE BACTERIAL ENDOCARDITIS: Status: ACTIVE | Noted: 2024-05-19

## 2024-05-19 PROBLEM — M79.89 BILATERAL SWELLING OF FEET: Status: ACTIVE | Noted: 2024-05-19

## 2024-05-19 PROBLEM — I35.8 AORTIC VALVE ENDOCARDITIS: Status: ACTIVE | Noted: 2024-05-19

## 2024-05-19 PROBLEM — R79.89 LFT ELEVATION: Status: ACTIVE | Noted: 2024-05-19

## 2024-05-19 LAB
ANION GAP SERPL CALCULATED.3IONS-SCNC: 12 MMOL/L (ref 3–16)
ANION GAP SERPL CALCULATED.3IONS-SCNC: 12 MMOL/L (ref 3–16)
BACTERIA BLD CULT: NORMAL
BASOPHILS # BLD: 0 K/UL (ref 0–0.2)
BASOPHILS NFR BLD: 0 %
BUN SERPL-MCNC: 32 MG/DL (ref 7–20)
BUN SERPL-MCNC: 32 MG/DL (ref 7–20)
C DIFF TOX A+B STL QL IA: NORMAL
CALCIUM SERPL-MCNC: 7.9 MG/DL (ref 8.3–10.6)
CALCIUM SERPL-MCNC: 7.9 MG/DL (ref 8.3–10.6)
CHLORIDE SERPL-SCNC: 103 MMOL/L (ref 99–110)
CHLORIDE SERPL-SCNC: 103 MMOL/L (ref 99–110)
CO2 SERPL-SCNC: 20 MMOL/L (ref 21–32)
CO2 SERPL-SCNC: 22 MMOL/L (ref 21–32)
CREAT SERPL-MCNC: 0.8 MG/DL (ref 0.9–1.3)
CREAT SERPL-MCNC: 0.8 MG/DL (ref 0.9–1.3)
DEPRECATED RDW RBC AUTO: 14 % (ref 12.4–15.4)
EOSINOPHIL # BLD: 0 K/UL (ref 0–0.6)
EOSINOPHIL NFR BLD: 0 %
G LAMBLIA AG STL QL IA: NORMAL
GFR SERPLBLD CREATININE-BSD FMLA CKD-EPI: >90 ML/MIN/{1.73_M2}
GFR SERPLBLD CREATININE-BSD FMLA CKD-EPI: >90 ML/MIN/{1.73_M2}
GLUCOSE SERPL-MCNC: 108 MG/DL (ref 70–99)
GLUCOSE SERPL-MCNC: 115 MG/DL (ref 70–99)
HAV IGM SERPL QL IA: ABNORMAL
HBV CORE IGM SERPL QL IA: ABNORMAL
HBV SURFACE AG SERPL QL IA: ABNORMAL
HCT VFR BLD AUTO: 44.7 % (ref 40.5–52.5)
HCV AB SERPL QL IA: REACTIVE
HGB BLD-MCNC: 15.5 G/DL (ref 13.5–17.5)
HIV 1+2 AB+HIV1 P24 AG SERPL QL IA: NORMAL
HIV 2 AB SERPL QL IA: NORMAL
HIV1 AB SERPL QL IA: NORMAL
HIV1 P24 AG SERPL QL IA: NORMAL
LYMPHOCYTES # BLD: 1.3 K/UL (ref 1–5.1)
LYMPHOCYTES NFR BLD: 6 %
MCH RBC QN AUTO: 30.6 PG (ref 26–34)
MCHC RBC AUTO-ENTMCNC: 34.8 G/DL (ref 31–36)
MCV RBC AUTO: 88.1 FL (ref 80–100)
MONOCYTES # BLD: 0.6 K/UL (ref 0–1.3)
MONOCYTES NFR BLD: 3 %
NEUTROPHILS # BLD: 16.7 K/UL (ref 1.7–7.7)
NEUTROPHILS NFR BLD: 85 %
NEUTS BAND NFR BLD MANUAL: 5 % (ref 0–7)
PLATELET # BLD AUTO: 58 K/UL (ref 135–450)
PLATELET BLD QL SMEAR: ABNORMAL
PMV BLD AUTO: 8.7 FL (ref 5–10.5)
POTASSIUM SERPL-SCNC: 4.3 MMOL/L (ref 3.5–5.1)
POTASSIUM SERPL-SCNC: 4.3 MMOL/L (ref 3.5–5.1)
RBC # BLD AUTO: 5.07 M/UL (ref 4.2–5.9)
SODIUM SERPL-SCNC: 135 MMOL/L (ref 136–145)
SODIUM SERPL-SCNC: 137 MMOL/L (ref 136–145)
TOXIC GRANULES BLD QL SMEAR: PRESENT
VARIANT LYMPHS NFR BLD MANUAL: 1 % (ref 0–6)
WBC # BLD AUTO: 18.6 K/UL (ref 4–11)

## 2024-05-19 PROCEDURE — 99254 IP/OBS CNSLTJ NEW/EST MOD 60: CPT | Performed by: INTERNAL MEDICINE

## 2024-05-19 PROCEDURE — 87329 GIARDIA AG IA: CPT

## 2024-05-19 PROCEDURE — 94761 N-INVAS EAR/PLS OXIMETRY MLT: CPT

## 2024-05-19 PROCEDURE — 87209 SMEAR COMPLEX STAIN: CPT

## 2024-05-19 PROCEDURE — 6360000002 HC RX W HCPCS: Performed by: STUDENT IN AN ORGANIZED HEALTH CARE EDUCATION/TRAINING PROGRAM

## 2024-05-19 PROCEDURE — 80048 BASIC METABOLIC PNL TOTAL CA: CPT

## 2024-05-19 PROCEDURE — 87324 CLOSTRIDIUM AG IA: CPT

## 2024-05-19 PROCEDURE — 2060000000 HC ICU INTERMEDIATE R&B

## 2024-05-19 PROCEDURE — 85025 COMPLETE CBC W/AUTO DIFF WBC: CPT

## 2024-05-19 PROCEDURE — 87449 NOS EACH ORGANISM AG IA: CPT

## 2024-05-19 PROCEDURE — 2580000003 HC RX 258: Performed by: STUDENT IN AN ORGANIZED HEALTH CARE EDUCATION/TRAINING PROGRAM

## 2024-05-19 PROCEDURE — 94640 AIRWAY INHALATION TREATMENT: CPT

## 2024-05-19 PROCEDURE — 87177 OVA AND PARASITES SMEARS: CPT

## 2024-05-19 PROCEDURE — 93005 ELECTROCARDIOGRAM TRACING: CPT | Performed by: INTERNAL MEDICINE

## 2024-05-19 PROCEDURE — 36415 COLL VENOUS BLD VENIPUNCTURE: CPT

## 2024-05-19 RX ADMIN — SODIUM CHLORIDE: 9 INJECTION, SOLUTION INTRAVENOUS at 00:34

## 2024-05-19 RX ADMIN — Medication 2 PUFF: at 09:29

## 2024-05-19 RX ADMIN — SODIUM CHLORIDE, PRESERVATIVE FREE 10 ML: 5 INJECTION INTRAVENOUS at 04:15

## 2024-05-19 RX ADMIN — WATER 2000 MG: 1 INJECTION INTRAMUSCULAR; INTRAVENOUS; SUBCUTANEOUS at 20:38

## 2024-05-19 RX ADMIN — SODIUM CHLORIDE, PRESERVATIVE FREE 10 ML: 5 INJECTION INTRAVENOUS at 08:41

## 2024-05-19 RX ADMIN — SODIUM CHLORIDE, PRESERVATIVE FREE 10 ML: 5 INJECTION INTRAVENOUS at 20:39

## 2024-05-19 RX ADMIN — WATER 2000 MG: 1 INJECTION INTRAMUSCULAR; INTRAVENOUS; SUBCUTANEOUS at 04:34

## 2024-05-19 RX ADMIN — ENOXAPARIN SODIUM 40 MG: 100 INJECTION SUBCUTANEOUS at 08:41

## 2024-05-19 RX ADMIN — ALBUTEROL SULFATE 2.5 MG: 2.5 SOLUTION RESPIRATORY (INHALATION) at 22:10

## 2024-05-19 RX ADMIN — WATER 2000 MG: 1 INJECTION INTRAMUSCULAR; INTRAVENOUS; SUBCUTANEOUS at 12:00

## 2024-05-19 ASSESSMENT — PAIN SCALES - GENERAL
PAINLEVEL_OUTOF10: 2
PAINLEVEL_OUTOF10: 0
PAINLEVEL_OUTOF10: 3
PAINLEVEL_OUTOF10: 0

## 2024-05-19 ASSESSMENT — PAIN DESCRIPTION - LOCATION: LOCATION: CHEST

## 2024-05-19 ASSESSMENT — PAIN DESCRIPTION - ORIENTATION: ORIENTATION: LEFT

## 2024-05-19 NOTE — PROGRESS NOTES
Report given to Morena STAPLETON at [facility]. No further questions at this time.     Patient receiving antibiotics. COWS scored. See flowsheet. Patient updated on plan of care.

## 2024-05-19 NOTE — DISCHARGE SUMMARY
Hospital Medicine Discharge Summary    Patient ID: Romeo Handley      Patient's PCP: No primary care provider on file.    Admit Date: 5/17/2024     Discharge Date: 5/18/2024      Admitting Physician: Deandre Hansen MD     Discharging Provider: Norah Hills PA-C       Hospital Course: Romeo Handley is a 43-year-old male with a past medical history of polysubstance abuse and Hepatitis C who presented to the ED with chief complaint of worsening fever, chills, cough and shortness of breath.  Patient was seen previously in the ED on 5/15 and diagnosed with UTI and pneumonia.  He was discharged home on oral Augmentin, however, blood cultures resulted positive for Staph aureus and he was advised to return to emergency department.  In the ED, patient met sepsis criteria with tachycardia and a white blood cell count of 20.7.  Repeat blood cultures were drawn.  He was started on broad-spectrum IV antibiotics with vancomycin, ceftriaxone and azithromycin.  He was also noted to have lower extremity swelling.  Duplex of bilateral legs ordered.  ID was consulted on admission and transitioned him to Ancef and clindamycin. His urine drug screen on 5/15 was positive for amphetamines, cocaine and fentanyl. He was monitored using COWS protocol. Echo was ordered given bacteremia in setting of known IVDU and did show evidence of multi-valve vegetation.  Decision to transfer to [facility] for CT surgery opinion.     Sepsis, POA  -criteria: HR, WBC  -suspect originating from soft tissue source given hx IVDU  -s/p IVF, hold further given peripheral edema   -repeat blood cultures pending  at time of transfer   -broad spectrum IV abx as below      Staph aureus bacteremia   Endocarditis   Pneumonia  UTI  -started on rocephin, zithromax and vancomycin on admission  -pharmacy adjusted to ancef and zithromax  -ID consulted for further recommendations --> Ancef and clindamycin   -repeat blood cultures pending at time of

## 2024-05-19 NOTE — CARE COORDINATION
Chart reviewed for discharge planning.    Pt transferred from [facility]. ID following. On IV abx.     Cardiology consult pending.    Pt has IV drug abuse history and will not be able to go home with IV antibiotics. Will most likely need placement.    Jennifer Orellana RN, BSN  [phone]

## 2024-05-19 NOTE — RT PROTOCOL NOTE
RT Inhaler-Nebulizer Bronchodilator Protocol Note    There is a bronchodilator order in the chart from a provider indicating to follow the RT Bronchodilator Protocol and there is an “Initiate RT Inhaler-Nebulizer Bronchodilator Protocol” order as well (see protocol at bottom of note).    CXR Findings:  XR CHEST PORTABLE    Result Date: 5/17/2024  1.  Improving airspace infiltrates compatible with improving pneumonia. 2.  Left lower lobe atelectasis       The findings from the last RT Protocol Assessment were as follows:   History Pulmonary Disease: Smoker 15 pack years or more  Respiratory Pattern: Dyspnea on exertion or RR 21-25 bpm  Breath Sounds: Slightly diminished and/or crackles  Cough: Strong, productive  Indication for Bronchodilator Therapy:    Bronchodilator Assessment Score: 6    Aerosolized bronchodilator medication orders have been revised according to the RT Inhaler-Nebulizer Bronchodilator Protocol below.    Respiratory Therapist to perform RT Therapy Protocol Assessment initially then follow the protocol.  Repeat RT Therapy Protocol Assessment PRN for score 0-3 or on second treatment, BID, and PRN for scores above 3.    No Indications - adjust the frequency to every 6 hours PRN wheezing or bronchospasm, if no treatments needed after 48 hours then discontinue using Per Protocol order mode.     If indication present, adjust the RT bronchodilator orders based on the Bronchodilator Assessment Score as indicated below.  Use Inhaler orders unless patient has one or more of the following: on home nebulizer, not able to hold breath for 10 seconds, is not alert and oriented, cannot activate and use MDI correctly, or respiratory rate 25 breaths per minute or more, then use the equivalent nebulizer order(s) with same Frequency and PRN reasons based on the score.  If a patient is on this medication at home then do not decrease Frequency below that used at home.    0-3 - enter or revise RT bronchodilator order(s)

## 2024-05-19 NOTE — H&P
V2.0  History and Physical      Name:  Romeo Handley /Age/Sex: 1980  (43 y.o. male)   MRN & CSN:  [id number] & [id number] Encounter Date/Time: 2024 10:17 PM EDT   Location:  [id number]- PCP: No primary care provider on file.       Hospital Day: 2    Assessment and Plan:   Romeo Handley is a 43 y.o. male with a pmh of polysubstance abuse  who presents with bacteremia.    Hospital Problems             Last Modified POA    * (Principal) Sepsis (HCC) 2024 Yes       Plan:  # MSSA bacteremia:  # Sepsis:  -Patient presented with chief complaint of worsening fever, chills, cough, and shortness of breath.  -Blood culture grew MSSA bacteremia  -Echo was done and showed normal EF and diastolic function.   Aortic valve: Trace regurgitation, no stenosis. There is a 1.46cm x 1.22cm echodensity seen on the non-coronary cuspid, consistent with a possible vegetation.  Mitral valve: Trace regurgitation, no stenosis noted, There is a 1.88cm x 1.07cm echodensity present on the anterior leaflet consistent with a vegetation.  Tricuspid valve: At least moderate regurgitation with possible perforation of the tricuspid valve leaflets. No stenosis noted. There is a 2.05cm x 1.06cm echodensity visualized, consistent with a vegetation.  -CRP 63.9, sed rate 21  -Ancef  -Repeat blood culture pending  -IV fluid  -ID consulted  -Cardiology consult    # Diarrhea:  -Check a stool C. Difficile  -Giardia  -Stool O&P    # Transaminitis  # History of hepatitis C  -Hepatitis panel positive for hepatitis C antibody  -hepatitis panel pending  -Return hepatic function panel    # Polysubstance abuse:  -UDS 5/15 +amphetmaines, cocaine and fentanyl  -monitor with COWS protocol  -advised cessation     # Tobacco use  -counseled cessation  -prn nicotine replacement         Disposition:   Current Living situation: Unknown  Expected Disposition: Unknown  Estimated D/C: 3 to 4 days    Diet ADULT DIET; Regular   DVT Prophylaxis [x]

## 2024-05-20 ENCOUNTER — TELEPHONE (OUTPATIENT)
Dept: CARDIOTHORACIC SURGERY | Age: 44
End: 2024-05-20

## 2024-05-20 ENCOUNTER — APPOINTMENT (OUTPATIENT)
Dept: CT IMAGING | Age: 44
DRG: 720 | End: 2024-05-20
Attending: FAMILY MEDICINE
Payer: COMMERCIAL

## 2024-05-20 PROBLEM — E87.1 HYPONATREMIA: Status: ACTIVE | Noted: 2024-05-20

## 2024-05-20 PROBLEM — R79.82 ELEVATED C-REACTIVE PROTEIN (CRP): Status: ACTIVE | Noted: 2024-05-20

## 2024-05-20 PROBLEM — I07.9: Status: ACTIVE | Noted: 2024-05-20

## 2024-05-20 PROBLEM — F19.10 IV DRUG ABUSE (HCC): Status: ACTIVE | Noted: 2024-05-20

## 2024-05-20 PROBLEM — D69.6 THROMBOCYTOPENIA (HCC): Status: ACTIVE | Noted: 2024-05-20

## 2024-05-20 PROBLEM — D72.825 BANDEMIA: Status: ACTIVE | Noted: 2024-05-20

## 2024-05-20 PROBLEM — R74.01 TRANSAMINITIS: Status: ACTIVE | Noted: 2024-05-20

## 2024-05-20 PROBLEM — R17 JAUNDICE: Status: ACTIVE | Noted: 2024-05-20

## 2024-05-20 PROBLEM — I05.9 ENDOCARDITIS OF MITRAL VALVE: Status: ACTIVE | Noted: 2024-05-19

## 2024-05-20 PROBLEM — I38 ENDOCARDITIS: Status: ACTIVE | Noted: 2024-05-20

## 2024-05-20 PROBLEM — R70.0 ELEVATED SED RATE: Status: ACTIVE | Noted: 2024-05-20

## 2024-05-20 PROBLEM — I07.9 ENDOCARDITIS OF TRICUSPID VALVE: Status: ACTIVE | Noted: 2024-05-19

## 2024-05-20 LAB
ALBUMIN SERPL-MCNC: 2.2 G/DL (ref 3.4–5)
ALP SERPL-CCNC: 104 U/L (ref 40–129)
ALT SERPL-CCNC: 30 U/L (ref 10–40)
ANION GAP SERPL CALCULATED.3IONS-SCNC: 11 MMOL/L (ref 3–16)
AST SERPL-CCNC: 68 U/L (ref 15–37)
BASOPHILS # BLD: 0.1 K/UL (ref 0–0.2)
BASOPHILS NFR BLD: 0.4 %
BILIRUB DIRECT SERPL-MCNC: 1 MG/DL (ref 0–0.3)
BILIRUB INDIRECT SERPL-MCNC: 1 MG/DL (ref 0–1)
BILIRUB SERPL-MCNC: 2 MG/DL (ref 0–1)
BUN SERPL-MCNC: 30 MG/DL (ref 7–20)
CALCIUM SERPL-MCNC: 7.5 MG/DL (ref 8.3–10.6)
CHLORIDE SERPL-SCNC: 104 MMOL/L (ref 99–110)
CO2 SERPL-SCNC: 20 MMOL/L (ref 21–32)
CREAT SERPL-MCNC: 0.8 MG/DL (ref 0.9–1.3)
DEPRECATED RDW RBC AUTO: 13.9 % (ref 12.4–15.4)
EKG ATRIAL RATE: 103 BPM
EKG DIAGNOSIS: NORMAL
EKG P AXIS: 59 DEGREES
EKG P-R INTERVAL: 126 MS
EKG Q-T INTERVAL: 344 MS
EKG QRS DURATION: 100 MS
EKG QTC CALCULATION (BAZETT): 450 MS
EKG R AXIS: 52 DEGREES
EKG T AXIS: 30 DEGREES
EKG VENTRICULAR RATE: 103 BPM
EOSINOPHIL # BLD: 0 K/UL (ref 0–0.6)
EOSINOPHIL NFR BLD: 0 %
GFR SERPLBLD CREATININE-BSD FMLA CKD-EPI: >90 ML/MIN/{1.73_M2}
GLUCOSE SERPL-MCNC: 112 MG/DL (ref 70–99)
HBV SURFACE AB SERPL IA-ACNC: 38.62 MIU/ML
HCT VFR BLD AUTO: 42.3 % (ref 40.5–52.5)
HGB BLD-MCNC: 14.3 G/DL (ref 13.5–17.5)
LYMPHOCYTES # BLD: 1.6 K/UL (ref 1–5.1)
LYMPHOCYTES NFR BLD: 6.8 %
MCH RBC QN AUTO: 29.7 PG (ref 26–34)
MCHC RBC AUTO-ENTMCNC: 33.8 G/DL (ref 31–36)
MCV RBC AUTO: 88.1 FL (ref 80–100)
MONOCYTES # BLD: 1.1 K/UL (ref 0–1.3)
MONOCYTES NFR BLD: 4.4 %
NEUTROPHILS # BLD: 21.2 K/UL (ref 1.7–7.7)
NEUTROPHILS NFR BLD: 88.4 %
PLATELET # BLD AUTO: 77 K/UL (ref 135–450)
PMV BLD AUTO: 9.4 FL (ref 5–10.5)
POTASSIUM SERPL-SCNC: 5.1 MMOL/L (ref 3.5–5.1)
PROT SERPL-MCNC: 6.1 G/DL (ref 6.4–8.2)
RBC # BLD AUTO: 4.8 M/UL (ref 4.2–5.9)
SODIUM SERPL-SCNC: 135 MMOL/L (ref 136–145)
TROPONIN, HIGH SENSITIVITY: 11 NG/L (ref 0–22)
WBC # BLD AUTO: 24 K/UL (ref 4–11)

## 2024-05-20 PROCEDURE — 2580000003 HC RX 258: Performed by: STUDENT IN AN ORGANIZED HEALTH CARE EDUCATION/TRAINING PROGRAM

## 2024-05-20 PROCEDURE — 94761 N-INVAS EAR/PLS OXIMETRY MLT: CPT

## 2024-05-20 PROCEDURE — 6360000004 HC RX CONTRAST MEDICATION: Performed by: INTERNAL MEDICINE

## 2024-05-20 PROCEDURE — 94640 AIRWAY INHALATION TREATMENT: CPT

## 2024-05-20 PROCEDURE — 6360000002 HC RX W HCPCS

## 2024-05-20 PROCEDURE — 36415 COLL VENOUS BLD VENIPUNCTURE: CPT

## 2024-05-20 PROCEDURE — 84484 ASSAY OF TROPONIN QUANT: CPT

## 2024-05-20 PROCEDURE — 93010 ELECTROCARDIOGRAM REPORT: CPT | Performed by: INTERNAL MEDICINE

## 2024-05-20 PROCEDURE — 71260 CT THORAX DX C+: CPT

## 2024-05-20 PROCEDURE — 99223 1ST HOSP IP/OBS HIGH 75: CPT | Performed by: INTERNAL MEDICINE

## 2024-05-20 PROCEDURE — 99233 SBSQ HOSP IP/OBS HIGH 50: CPT | Performed by: INTERNAL MEDICINE

## 2024-05-20 PROCEDURE — 87522 HEPATITIS C REVRS TRNSCRPJ: CPT

## 2024-05-20 PROCEDURE — 6360000002 HC RX W HCPCS: Performed by: STUDENT IN AN ORGANIZED HEALTH CARE EDUCATION/TRAINING PROGRAM

## 2024-05-20 PROCEDURE — 86706 HEP B SURFACE ANTIBODY: CPT

## 2024-05-20 PROCEDURE — 2060000000 HC ICU INTERMEDIATE R&B

## 2024-05-20 PROCEDURE — 6370000000 HC RX 637 (ALT 250 FOR IP): Performed by: STUDENT IN AN ORGANIZED HEALTH CARE EDUCATION/TRAINING PROGRAM

## 2024-05-20 PROCEDURE — 80048 BASIC METABOLIC PNL TOTAL CA: CPT

## 2024-05-20 PROCEDURE — 70470 CT HEAD/BRAIN W/O & W/DYE: CPT

## 2024-05-20 PROCEDURE — 85025 COMPLETE CBC W/AUTO DIFF WBC: CPT

## 2024-05-20 PROCEDURE — 80076 HEPATIC FUNCTION PANEL: CPT

## 2024-05-20 RX ORDER — SODIUM CHLORIDE, SODIUM LACTATE, POTASSIUM CHLORIDE, CALCIUM CHLORIDE 600; 310; 30; 20 MG/100ML; MG/100ML; MG/100ML; MG/100ML
INJECTION, SOLUTION INTRAVENOUS CONTINUOUS
Status: DISCONTINUED | OUTPATIENT
Start: 2024-05-20 | End: 2024-05-23

## 2024-05-20 RX ORDER — SODIUM CHLORIDE 9 MG/ML
INJECTION, SOLUTION INTRAVENOUS PRN
Status: DISCONTINUED | OUTPATIENT
Start: 2024-05-20 | End: 2024-05-27 | Stop reason: HOSPADM

## 2024-05-20 RX ORDER — SODIUM CHLORIDE 0.9 % (FLUSH) 0.9 %
5-40 SYRINGE (ML) INJECTION PRN
Status: DISCONTINUED | OUTPATIENT
Start: 2024-05-20 | End: 2024-05-27 | Stop reason: HOSPADM

## 2024-05-20 RX ORDER — SODIUM CHLORIDE 0.9 % (FLUSH) 0.9 %
5-40 SYRINGE (ML) INJECTION EVERY 12 HOURS SCHEDULED
Status: DISCONTINUED | OUTPATIENT
Start: 2024-05-20 | End: 2024-05-27 | Stop reason: HOSPADM

## 2024-05-20 RX ADMIN — ALBUTEROL SULFATE 2.5 MG: 2.5 SOLUTION RESPIRATORY (INHALATION) at 08:12

## 2024-05-20 RX ADMIN — SODIUM CHLORIDE, POTASSIUM CHLORIDE, SODIUM LACTATE AND CALCIUM CHLORIDE: 600; 310; 30; 20 INJECTION, SOLUTION INTRAVENOUS at 09:10

## 2024-05-20 RX ADMIN — SODIUM CHLORIDE, PRESERVATIVE FREE 10 ML: 5 INJECTION INTRAVENOUS at 07:37

## 2024-05-20 RX ADMIN — IOPAMIDOL 75 ML: 755 INJECTION, SOLUTION INTRAVENOUS at 16:58

## 2024-05-20 RX ADMIN — IOPAMIDOL 75 ML: 755 INJECTION, SOLUTION INTRAVENOUS at 17:07

## 2024-05-20 RX ADMIN — WATER 2000 MG: 1 INJECTION INTRAMUSCULAR; INTRAVENOUS; SUBCUTANEOUS at 12:19

## 2024-05-20 RX ADMIN — SODIUM CHLORIDE, POTASSIUM CHLORIDE, SODIUM LACTATE AND CALCIUM CHLORIDE: 600; 310; 30; 20 INJECTION, SOLUTION INTRAVENOUS at 21:29

## 2024-05-20 RX ADMIN — WATER 2000 MG: 1 INJECTION INTRAMUSCULAR; INTRAVENOUS; SUBCUTANEOUS at 20:58

## 2024-05-20 RX ADMIN — WATER 2000 MG: 1 INJECTION INTRAMUSCULAR; INTRAVENOUS; SUBCUTANEOUS at 03:56

## 2024-05-20 RX ADMIN — ALBUTEROL SULFATE 2.5 MG: 2.5 SOLUTION RESPIRATORY (INHALATION) at 21:38

## 2024-05-20 RX ADMIN — SODIUM CHLORIDE, PRESERVATIVE FREE 10 ML: 5 INJECTION INTRAVENOUS at 21:05

## 2024-05-20 RX ADMIN — ACETAMINOPHEN 650 MG: 325 TABLET ORAL at 07:40

## 2024-05-20 ASSESSMENT — PAIN SCALES - GENERAL: PAINLEVEL_OUTOF10: 0

## 2024-05-21 ENCOUNTER — APPOINTMENT (OUTPATIENT)
Dept: ULTRASOUND IMAGING | Age: 44
DRG: 720 | End: 2024-05-21
Attending: FAMILY MEDICINE
Payer: COMMERCIAL

## 2024-05-21 ENCOUNTER — ANESTHESIA EVENT (OUTPATIENT)
Age: 44
End: 2024-05-21
Payer: COMMERCIAL

## 2024-05-21 ENCOUNTER — HOSPITAL ENCOUNTER (INPATIENT)
Age: 44
Discharge: HOME | DRG: 720 | End: 2024-05-23
Attending: INTERNAL MEDICINE
Payer: COMMERCIAL

## 2024-05-21 ENCOUNTER — APPOINTMENT (OUTPATIENT)
Dept: MRI IMAGING | Age: 44
DRG: 720 | End: 2024-05-21
Attending: FAMILY MEDICINE
Payer: COMMERCIAL

## 2024-05-21 ENCOUNTER — ANESTHESIA (OUTPATIENT)
Age: 44
End: 2024-05-21
Payer: COMMERCIAL

## 2024-05-21 VITALS
SYSTOLIC BLOOD PRESSURE: 108 MMHG | WEIGHT: 163 LBS | HEIGHT: 74 IN | OXYGEN SATURATION: 93 % | BODY MASS INDEX: 20.92 KG/M2 | DIASTOLIC BLOOD PRESSURE: 44 MMHG | HEART RATE: 98 BPM | RESPIRATION RATE: 21 BRPM

## 2024-05-21 PROBLEM — I66.9 INTRACRANIAL SEPTIC EMBOLISM (HCC): Status: ACTIVE | Noted: 2024-05-21

## 2024-05-21 PROBLEM — I35.8 AORTIC VALVE ENDOCARDITIS: Status: ACTIVE | Noted: 2024-05-21

## 2024-05-21 PROBLEM — I26.90 ACUTE SEPTIC PULMONARY EMBOLISM WITHOUT ACUTE COR PULMONALE (HCC): Status: ACTIVE | Noted: 2024-05-21

## 2024-05-21 PROBLEM — I76 INTRACRANIAL SEPTIC EMBOLISM (HCC): Status: ACTIVE | Noted: 2024-05-21

## 2024-05-21 PROBLEM — I05.9 ENDOCARDITIS OF MITRAL VALVE: Status: ACTIVE | Noted: 2024-05-20

## 2024-05-21 LAB
ALBUMIN SERPL-MCNC: 1.9 G/DL (ref 3.4–5)
ALP SERPL-CCNC: 101 U/L (ref 40–129)
ALT SERPL-CCNC: 22 U/L (ref 10–40)
ANION GAP SERPL CALCULATED.3IONS-SCNC: 11 MMOL/L (ref 3–16)
AST SERPL-CCNC: 51 U/L (ref 15–37)
BILIRUB DIRECT SERPL-MCNC: 0.8 MG/DL (ref 0–0.3)
BILIRUB INDIRECT SERPL-MCNC: 0.9 MG/DL (ref 0–1)
BILIRUB SERPL-MCNC: 1.7 MG/DL (ref 0–1)
BUN SERPL-MCNC: 34 MG/DL (ref 7–20)
CALCIUM SERPL-MCNC: 7.5 MG/DL (ref 8.3–10.6)
CHLORIDE SERPL-SCNC: 104 MMOL/L (ref 99–110)
CO2 SERPL-SCNC: 18 MMOL/L (ref 21–32)
CREAT SERPL-MCNC: 1 MG/DL (ref 0.9–1.3)
DEPRECATED RDW RBC AUTO: 14.3 % (ref 12.4–15.4)
ECHO BSA: 1.96 M2
GFR SERPLBLD CREATININE-BSD FMLA CKD-EPI: >90 ML/MIN/{1.73_M2}
GLUCOSE SERPL-MCNC: 112 MG/DL (ref 70–99)
HCT VFR BLD AUTO: 40.4 % (ref 40.5–52.5)
HGB BLD-MCNC: 14.1 G/DL (ref 13.5–17.5)
MCH RBC QN AUTO: 30.9 PG (ref 26–34)
MCHC RBC AUTO-ENTMCNC: 35 G/DL (ref 31–36)
MCV RBC AUTO: 88.4 FL (ref 80–100)
PLATELET # BLD AUTO: 77 K/UL (ref 135–450)
PMV BLD AUTO: 9.1 FL (ref 5–10.5)
POTASSIUM SERPL-SCNC: 5.5 MMOL/L (ref 3.5–5.1)
PROT SERPL-MCNC: 6.2 G/DL (ref 6.4–8.2)
RBC # BLD AUTO: 4.57 M/UL (ref 4.2–5.9)
SODIUM SERPL-SCNC: 133 MMOL/L (ref 136–145)
WBC # BLD AUTO: 26.3 K/UL (ref 4–11)

## 2024-05-21 PROCEDURE — 80048 BASIC METABOLIC PNL TOTAL CA: CPT

## 2024-05-21 PROCEDURE — 2060000000 HC ICU INTERMEDIATE R&B

## 2024-05-21 PROCEDURE — 80076 HEPATIC FUNCTION PANEL: CPT

## 2024-05-21 PROCEDURE — 6360000002 HC RX W HCPCS: Performed by: STUDENT IN AN ORGANIZED HEALTH CARE EDUCATION/TRAINING PROGRAM

## 2024-05-21 PROCEDURE — 93312 ECHO TRANSESOPHAGEAL: CPT | Performed by: INTERNAL MEDICINE

## 2024-05-21 PROCEDURE — 2500000003 HC RX 250 WO HCPCS: Performed by: NURSE ANESTHETIST, CERTIFIED REGISTERED

## 2024-05-21 PROCEDURE — 85027 COMPLETE CBC AUTOMATED: CPT

## 2024-05-21 PROCEDURE — 36415 COLL VENOUS BLD VENIPUNCTURE: CPT

## 2024-05-21 PROCEDURE — B24BZZ4 ULTRASONOGRAPHY OF HEART WITH AORTA, TRANSESOPHAGEAL: ICD-10-PCS | Performed by: INTERNAL MEDICINE

## 2024-05-21 PROCEDURE — 6360000004 HC RX CONTRAST MEDICATION: Performed by: INTERNAL MEDICINE

## 2024-05-21 PROCEDURE — 7100000010 HC PHASE II RECOVERY - FIRST 15 MIN: Performed by: INTERNAL MEDICINE

## 2024-05-21 PROCEDURE — 87040 BLOOD CULTURE FOR BACTERIA: CPT

## 2024-05-21 PROCEDURE — 6370000000 HC RX 637 (ALT 250 FOR IP): Performed by: INTERNAL MEDICINE

## 2024-05-21 PROCEDURE — 93320 DOPPLER ECHO COMPLETE: CPT | Performed by: INTERNAL MEDICINE

## 2024-05-21 PROCEDURE — 70553 MRI BRAIN STEM W/O & W/DYE: CPT

## 2024-05-21 PROCEDURE — A9577 INJ MULTIHANCE: HCPCS | Performed by: INTERNAL MEDICINE

## 2024-05-21 PROCEDURE — 99233 SBSQ HOSP IP/OBS HIGH 50: CPT | Performed by: INTERNAL MEDICINE

## 2024-05-21 PROCEDURE — 2500000003 HC RX 250 WO HCPCS: Performed by: INTERNAL MEDICINE

## 2024-05-21 PROCEDURE — 7100000011 HC PHASE II RECOVERY - ADDTL 15 MIN: Performed by: INTERNAL MEDICINE

## 2024-05-21 PROCEDURE — 76705 ECHO EXAM OF ABDOMEN: CPT

## 2024-05-21 PROCEDURE — 2580000003 HC RX 258: Performed by: INTERNAL MEDICINE

## 2024-05-21 PROCEDURE — 3700000000 HC ANESTHESIA ATTENDED CARE: Performed by: INTERNAL MEDICINE

## 2024-05-21 PROCEDURE — 93319 3D ECHO IMG CGEN CAR ANOMAL: CPT

## 2024-05-21 PROCEDURE — 2580000003 HC RX 258: Performed by: STUDENT IN AN ORGANIZED HEALTH CARE EDUCATION/TRAINING PROGRAM

## 2024-05-21 PROCEDURE — 3700000001 HC ADD 15 MINUTES (ANESTHESIA): Performed by: INTERNAL MEDICINE

## 2024-05-21 PROCEDURE — 6360000002 HC RX W HCPCS: Performed by: INTERNAL MEDICINE

## 2024-05-21 PROCEDURE — 93319 3D ECHO IMG CGEN CAR ANOMAL: CPT | Performed by: INTERNAL MEDICINE

## 2024-05-21 PROCEDURE — 6360000002 HC RX W HCPCS: Performed by: NURSE ANESTHETIST, CERTIFIED REGISTERED

## 2024-05-21 PROCEDURE — 94640 AIRWAY INHALATION TREATMENT: CPT

## 2024-05-21 RX ORDER — PROPOFOL 10 MG/ML
INJECTION, EMULSION INTRAVENOUS PRN
Status: DISCONTINUED | OUTPATIENT
Start: 2024-05-21 | End: 2024-05-21 | Stop reason: SDUPTHER

## 2024-05-21 RX ORDER — SODIUM CHLORIDE 0.9 % (FLUSH) 0.9 %
5-40 SYRINGE (ML) INJECTION EVERY 12 HOURS SCHEDULED
Status: DISCONTINUED | OUTPATIENT
Start: 2024-05-21 | End: 2024-05-27 | Stop reason: HOSPADM

## 2024-05-21 RX ORDER — LIDOCAINE HYDROCHLORIDE 20 MG/ML
INJECTION, SOLUTION EPIDURAL; INFILTRATION; INTRACAUDAL; PERINEURAL PRN
Status: DISCONTINUED | OUTPATIENT
Start: 2024-05-21 | End: 2024-05-21 | Stop reason: SDUPTHER

## 2024-05-21 RX ORDER — FUROSEMIDE 10 MG/ML
40 INJECTION INTRAMUSCULAR; INTRAVENOUS DAILY
Status: DISCONTINUED | OUTPATIENT
Start: 2024-05-21 | End: 2024-05-22

## 2024-05-21 RX ORDER — SODIUM CHLORIDE 0.9 % (FLUSH) 0.9 %
10 SYRINGE (ML) INJECTION ONCE
Status: DISCONTINUED | OUTPATIENT
Start: 2024-05-21 | End: 2024-05-27 | Stop reason: HOSPADM

## 2024-05-21 RX ORDER — SODIUM CHLORIDE 0.9 % (FLUSH) 0.9 %
5-40 SYRINGE (ML) INJECTION PRN
Status: DISCONTINUED | OUTPATIENT
Start: 2024-05-21 | End: 2024-05-27 | Stop reason: HOSPADM

## 2024-05-21 RX ORDER — SODIUM CHLORIDE 9 MG/ML
INJECTION, SOLUTION INTRAVENOUS CONTINUOUS PRN
Status: DISCONTINUED | OUTPATIENT
Start: 2024-05-21 | End: 2024-05-21 | Stop reason: SDUPTHER

## 2024-05-21 RX ORDER — NICOTINE 21 MG/24HR
1 PATCH, TRANSDERMAL 24 HOURS TRANSDERMAL DAILY
Status: DISCONTINUED | OUTPATIENT
Start: 2024-05-21 | End: 2024-05-27 | Stop reason: HOSPADM

## 2024-05-21 RX ADMIN — SODIUM CHLORIDE, PRESERVATIVE FREE 10 ML: 5 INJECTION INTRAVENOUS at 21:36

## 2024-05-21 RX ADMIN — NAFCILLIN SODIUM 2000 MG: 2 INJECTION, POWDER, LYOPHILIZED, FOR SOLUTION INTRAMUSCULAR; INTRAVENOUS at 14:43

## 2024-05-21 RX ADMIN — PROPOFOL 30 MG: 10 INJECTION, EMULSION INTRAVENOUS at 11:01

## 2024-05-21 RX ADMIN — PHENYLEPHRINE HYDROCHLORIDE 100 MCG: 10 INJECTION INTRAVENOUS at 11:08

## 2024-05-21 RX ADMIN — LIDOCAINE HYDROCHLORIDE 100 MG: 20 INJECTION, SOLUTION EPIDURAL; INFILTRATION; INTRACAUDAL; PERINEURAL at 11:00

## 2024-05-21 RX ADMIN — PROPOFOL 50 MG: 10 INJECTION, EMULSION INTRAVENOUS at 11:00

## 2024-05-21 RX ADMIN — SODIUM CHLORIDE: 9 INJECTION, SOLUTION INTRAVENOUS at 10:55

## 2024-05-21 RX ADMIN — BUPRENORPHINE AND NALOXONE 2 FILM: 2; .5 FILM, SOLUBLE BUCCAL; SUBLINGUAL at 04:58

## 2024-05-21 RX ADMIN — WATER 2000 MG: 1 INJECTION INTRAMUSCULAR; INTRAVENOUS; SUBCUTANEOUS at 04:35

## 2024-05-21 RX ADMIN — PROPOFOL 25 MG: 10 INJECTION, EMULSION INTRAVENOUS at 11:12

## 2024-05-21 RX ADMIN — PHENYLEPHRINE HYDROCHLORIDE 200 MCG: 10 INJECTION INTRAVENOUS at 11:13

## 2024-05-21 RX ADMIN — PROPOFOL 25 MG: 10 INJECTION, EMULSION INTRAVENOUS at 11:10

## 2024-05-21 RX ADMIN — PROPOFOL 25 MG: 10 INJECTION, EMULSION INTRAVENOUS at 11:06

## 2024-05-21 RX ADMIN — WATER 2000 MG: 1 INJECTION INTRAMUSCULAR; INTRAVENOUS; SUBCUTANEOUS at 12:29

## 2024-05-21 RX ADMIN — GADOBENATE DIMEGLUMINE 16 ML: 529 INJECTION, SOLUTION INTRAVENOUS at 10:08

## 2024-05-21 RX ADMIN — SODIUM CHLORIDE, POTASSIUM CHLORIDE, SODIUM LACTATE AND CALCIUM CHLORIDE: 600; 310; 30; 20 INJECTION, SOLUTION INTRAVENOUS at 14:24

## 2024-05-21 RX ADMIN — FUROSEMIDE 40 MG: 10 INJECTION, SOLUTION INTRAMUSCULAR; INTRAVENOUS at 12:39

## 2024-05-21 RX ADMIN — ALBUTEROL SULFATE 2.5 MG: 2.5 SOLUTION RESPIRATORY (INHALATION) at 19:40

## 2024-05-21 RX ADMIN — NAFCILLIN SODIUM 2000 MG: 2 INJECTION, POWDER, LYOPHILIZED, FOR SOLUTION INTRAMUSCULAR; INTRAVENOUS at 21:43

## 2024-05-21 RX ADMIN — NAFCILLIN SODIUM 2000 MG: 2 INJECTION, POWDER, LYOPHILIZED, FOR SOLUTION INTRAMUSCULAR; INTRAVENOUS at 18:56

## 2024-05-21 ASSESSMENT — PAIN SCALES - GENERAL: PAINLEVEL_OUTOF10: 7

## 2024-05-21 ASSESSMENT — LIFESTYLE VARIABLES: SMOKING_STATUS: 1

## 2024-05-21 ASSESSMENT — PAIN DESCRIPTION - DESCRIPTORS: DESCRIPTORS: ACHING

## 2024-05-21 ASSESSMENT — PAIN DESCRIPTION - LOCATION: LOCATION: GENERALIZED

## 2024-05-21 ASSESSMENT — ENCOUNTER SYMPTOMS: SHORTNESS OF BREATH: 1

## 2024-05-21 NOTE — ANESTHESIA POSTPROCEDURE EVALUATION
Department of Anesthesiology  Postprocedure Note    Patient: Romeo Handley  MRN: [id number]  YOB: 1980  Date of evaluation: 5/21/2024    Procedure Summary       Date: 05/21/24 Room / Location: [facility]    Anesthesia Start: 1055 Anesthesia Stop:     Procedure: NATALYA (PRN CONTRAST/BUBBLE/3D) Diagnosis: Bacteremia    Scheduled Providers: Chapo Mulligan MD Responsible Provider: Raissa Dent MD    Anesthesia Type: general ASA Status: 3            Anesthesia Type: No value filed.    Carlos Eduardo Phase I:      Carlos Eduardo Phase II:      Anesthesia Post Evaluation    Patient location during evaluation: bedside  Patient participation: complete - patient participated  Level of consciousness: awake and alert  Pain score: 2  Airway patency: patent  Nausea & Vomiting: no vomiting  Cardiovascular status: hemodynamically stable  Respiratory status: nonlabored ventilation  Hydration status: stable  Multimodal analgesia pain management approach  Pain management: adequate    No notable events documented.

## 2024-05-21 NOTE — CARE COORDINATION
Discharge Planning  Patient was provided with the list of SNFs  that are in Network with his Insurance to choose from in incase he may need to continue with IV Antibiotics on discharge.

## 2024-05-21 NOTE — ANESTHESIA PRE PROCEDURE
Department of Anesthesiology  Preprocedure Note       Name:  Romeo Handley   Age:  43 y.o.  :  1980                                          MRN:  [id number]         Date:  2024      Surgeon: * Surgery not found *    Procedure:     Medications prior to admission:   Prior to Admission medications    Medication Sig Start Date End Date Taking? Authorizing Provider   amoxicillin-clavulanate (AUGMENTIN) 875-125 MG per tablet Take 1 tablet by mouth 2 times daily for 10 days  Patient not taking: Reported on 2024 5/15/24 5/25/24  Noe Boudreaux DO   methylPREDNISolone (MEDROL, CHRISTOPHER,) 4 MG tablet Take 1 tablet by mouth See Admin Instructions for 6 days By mouth as directed on the package.  Start this medication on 2024  Patient not taking: Reported on 2024 5/15/24 5/21/24  Noe Boudreaux DO   albuterol sulfate HFA (PROVENTIL;VENTOLIN;PROAIR) 108 (90 Base) MCG/ACT inhaler Inhale 2 puffs into the lungs every 4 hours as needed for Wheezing  Patient not taking: Reported on 2024 5/15/24   Noe Boudreaux DO   ibuprofen (IBU) 600 MG tablet Take 1 tablet by mouth every 6 hours as needed for Pain  Patient not taking: Reported on 2024   Melody Donato PA-C       Current medications:    No current facility-administered medications for this encounter.     No current outpatient medications on file.     Facility-Administered Medications Ordered in Other Encounters   Medication Dose Route Frequency Provider Last Rate Last Admin    sodium chloride flush 0.9 % injection 10 mL  10 mL IntraVENous Once Wallace Reed MD        lactated ringers IV soln infusion   IntraVENous Continuous Maximo Abebe MD 75 mL/hr at 24 New Bag at 24    sodium chloride flush 0.9 % injection 5-40 mL  5-40 mL IntraVENous 2 times per day Chapo Mulligan MD        sodium chloride flush 0.9 % injection 5-40 mL  5-40 mL IntraVENous PRN Chapo Mulligan MD        0.9 % sodium

## 2024-05-21 NOTE — ACP (ADVANCE CARE PLANNING)
Advanced Care Planning Note.    Purpose of Encounter: Advanced care planning in light of aortic and tricuspid valve endocarditis  Parties In Attendance: Patient  Decisional Capacity: Yes  Subjective: Patient with HA and SOB  Objective: Cr 1.0  Goals of Care Determination: Patient wants full support (CPR, vent, surgery, HD, trach, PEG)  Plan:  IV Abx, Echo, NATALYA, MRI Brain, ID/Cardio/CTS consults  Code Status: Full code   Time spent on Advanced care Plannin minutes  Advanced Care Planning Documents: Completed advanced directives on chart, mother is the POA.    Petey Spain MD  2024 8:31 AM

## 2024-05-21 NOTE — DISCHARGE INSTR - COC
Continuity of Care Form    Patient Name: Romeo Handley   :  1980  MRN:  [id number]    Admit date:  2024  Discharge date:  ***    Code Status Order: Full Code   Advance Directives:     Admitting Physician:  Frantz Oropeza MD  PCP: No primary care provider on file.    Discharging Nurse: ***  Discharging Hospital Unit/Room#: Q5D-1021/5917-01  Discharging Unit Phone Number: ***    Emergency Contact:   Extended Emergency Contact Information  Primary Emergency Contact: Sharon Campos   [facility]  Home Phone: [phone]  Relation: Other    Past Surgical History:  Past Surgical History:   Procedure Laterality Date    ABDOMEN SURGERY      spleen    DENTAL SURGERY      FRACTURE SURGERY      left ankle       Immunization History:     There is no immunization history on file for this patient.    Active Problems:  Patient Active Problem List   Diagnosis Code    Tobacco use disorder-advised to quit F17.200    Drug use-(hx percocet/heroin use) F19.90    Hepatitis C-sent to gi for eval B19.20    Injury of tip of finger of left hand S69.92XA    Pneumonia in infectious disease J18.9    Complicated UTI (urinary tract infection) N39.0    Bacteremia due to methicillin susceptible Staphylococcus aureus (MSSA) R78.81, B95.61    Drug abuse ([facility]) F19.10    Sepsis ([facility]) A41.9    Bilateral swelling of feet M79.89    Acute bacterial endocarditis I33.0    Endocarditis of tricuspid valve I07.9    LFT elevation R79.89    Fever and chills R50.9    Neutrophilia D72.9    Intravenous drug abuse ([facility]) F19.10    Bacteremia R78.81    Hyponatremia E87.1    Jaundice R17    Perforation of leaflet of tricuspid valve I07.9    Thrombocytopenia ([facility]) D69.6    Bandemia D72.825    Elevated sed rate R70.0    Elevated C-reactive protein (CRP) R79.82    Transaminitis R74.01    Endocarditis I38    IV drug abuse ([facility]) F19.10    Intracranial septic embolism ([facility]) I76, I66.9    Aortic valve endocarditis I35.8       Isolation/Infection:

## 2024-05-21 NOTE — OP NOTE
Operative Note-NATALYA      Patient: Romeo Handley  YOB: 1980  MRN: [id number]    Date of Procedure: 5/21/2024  Electronically signed by Chapo Mulligan MD on 5/21/2024 at 11:35 AM    Indication: Endocarditis    Sedation: per anesthesia    Prelim Findings:    Normal LV function  Tricuspid valve endocarditis with large veg and flail leaflet with very severe TR  Aortic valve with vegetation involving the right and non-coronary cusps with severe AI  No vegetation seen on Mitral of pulmonic valves  Pleural effusion.    IMP:  Endocarditis involving the Aortic and tricuspid valves with severe AI and TR  Pt with CHF today      PLAN:  CT surgery consult-however, pt would be very high risk surgical candidate  Start iv lasix  F?u MRI brain  Continue antibiotics    Chapo Mulligan MD

## 2024-05-22 LAB
ALBUMIN SERPL-MCNC: 1.9 G/DL (ref 3.4–5)
ALP SERPL-CCNC: 72 U/L (ref 40–129)
ALT SERPL-CCNC: 14 U/L (ref 10–40)
ANION GAP SERPL CALCULATED.3IONS-SCNC: 10 MMOL/L (ref 3–16)
AST SERPL-CCNC: 40 U/L (ref 15–37)
BACTERIA BLD CULT ORG #2: ABNORMAL
BACTERIA BLD CULT: ABNORMAL
BACTERIA BLD CULT: NORMAL
BILIRUB DIRECT SERPL-MCNC: 1.7 MG/DL (ref 0–0.3)
BILIRUB INDIRECT SERPL-MCNC: 0.7 MG/DL (ref 0–1)
BILIRUB SERPL-MCNC: 2.4 MG/DL (ref 0–1)
BUN SERPL-MCNC: 42 MG/DL (ref 7–20)
CALCIUM SERPL-MCNC: 7.3 MG/DL (ref 8.3–10.6)
CHLORIDE SERPL-SCNC: 103 MMOL/L (ref 99–110)
CO2 SERPL-SCNC: 19 MMOL/L (ref 21–32)
CREAT SERPL-MCNC: 1 MG/DL (ref 0.9–1.3)
DEPRECATED RDW RBC AUTO: 14.5 % (ref 12.4–15.4)
GFR SERPLBLD CREATININE-BSD FMLA CKD-EPI: >90 ML/MIN/{1.73_M2}
GLUCOSE SERPL-MCNC: 112 MG/DL (ref 70–99)
HCT VFR BLD AUTO: 39.3 % (ref 40.5–52.5)
HCV RNA SERPL NAA+PROBE-ACNC: ABNORMAL IU/ML
HCV RNA SERPL NAA+PROBE-LOG IU: 5.97 LOG IU/ML
HCV RNA SERPL QL NAA+PROBE: DETECTED
HGB BLD-MCNC: 13.6 G/DL (ref 13.5–17.5)
INTERPRETATION: NEGATIVE
MCH RBC QN AUTO: 31 PG (ref 26–34)
MCHC RBC AUTO-ENTMCNC: 34.6 G/DL (ref 31–36)
MCV RBC AUTO: 89.6 FL (ref 80–100)
ORGANISM: ABNORMAL
ORGANISM: ABNORMAL
PLATELET # BLD AUTO: 70 K/UL (ref 135–450)
PMV BLD AUTO: 9.4 FL (ref 5–10.5)
POTASSIUM SERPL-SCNC: 4.6 MMOL/L (ref 3.5–5.1)
PROT SERPL-MCNC: 5.8 G/DL (ref 6.4–8.2)
RBC # BLD AUTO: 4.38 M/UL (ref 4.2–5.9)
SODIUM SERPL-SCNC: 132 MMOL/L (ref 136–145)
WBC # BLD AUTO: 20.3 K/UL (ref 4–11)

## 2024-05-22 PROCEDURE — 80048 BASIC METABOLIC PNL TOTAL CA: CPT

## 2024-05-22 PROCEDURE — 2500000003 HC RX 250 WO HCPCS: Performed by: INTERNAL MEDICINE

## 2024-05-22 PROCEDURE — 80076 HEPATIC FUNCTION PANEL: CPT

## 2024-05-22 PROCEDURE — 94761 N-INVAS EAR/PLS OXIMETRY MLT: CPT

## 2024-05-22 PROCEDURE — 6360000002 HC RX W HCPCS: Performed by: INTERNAL MEDICINE

## 2024-05-22 PROCEDURE — 87040 BLOOD CULTURE FOR BACTERIA: CPT

## 2024-05-22 PROCEDURE — 2580000003 HC RX 258: Performed by: INTERNAL MEDICINE

## 2024-05-22 PROCEDURE — 87150 DNA/RNA AMPLIFIED PROBE: CPT

## 2024-05-22 PROCEDURE — 94640 AIRWAY INHALATION TREATMENT: CPT

## 2024-05-22 PROCEDURE — 85027 COMPLETE CBC AUTOMATED: CPT

## 2024-05-22 PROCEDURE — 99233 SBSQ HOSP IP/OBS HIGH 50: CPT | Performed by: INTERNAL MEDICINE

## 2024-05-22 PROCEDURE — 2060000000 HC ICU INTERMEDIATE R&B

## 2024-05-22 PROCEDURE — 36415 COLL VENOUS BLD VENIPUNCTURE: CPT

## 2024-05-22 PROCEDURE — 6370000000 HC RX 637 (ALT 250 FOR IP): Performed by: STUDENT IN AN ORGANIZED HEALTH CARE EDUCATION/TRAINING PROGRAM

## 2024-05-22 PROCEDURE — 2580000003 HC RX 258: Performed by: STUDENT IN AN ORGANIZED HEALTH CARE EDUCATION/TRAINING PROGRAM

## 2024-05-22 PROCEDURE — 6370000000 HC RX 637 (ALT 250 FOR IP): Performed by: INTERNAL MEDICINE

## 2024-05-22 PROCEDURE — 87186 SC STD MICRODIL/AGAR DIL: CPT

## 2024-05-22 RX ORDER — ALBUTEROL SULFATE 2.5 MG/3ML
2.5 SOLUTION RESPIRATORY (INHALATION) EVERY 4 HOURS PRN
Status: DISCONTINUED | OUTPATIENT
Start: 2024-05-22 | End: 2024-05-27 | Stop reason: HOSPADM

## 2024-05-22 RX ADMIN — NAFCILLIN SODIUM 2000 MG: 2 INJECTION, POWDER, LYOPHILIZED, FOR SOLUTION INTRAMUSCULAR; INTRAVENOUS at 02:29

## 2024-05-22 RX ADMIN — SODIUM CHLORIDE, POTASSIUM CHLORIDE, SODIUM LACTATE AND CALCIUM CHLORIDE: 600; 310; 30; 20 INJECTION, SOLUTION INTRAVENOUS at 17:50

## 2024-05-22 RX ADMIN — NAFCILLIN SODIUM 2000 MG: 2 INJECTION, POWDER, LYOPHILIZED, FOR SOLUTION INTRAMUSCULAR; INTRAVENOUS at 14:50

## 2024-05-22 RX ADMIN — ALBUTEROL SULFATE 2.5 MG: 2.5 SOLUTION RESPIRATORY (INHALATION) at 17:51

## 2024-05-22 RX ADMIN — NAFCILLIN SODIUM 2000 MG: 2 INJECTION, POWDER, LYOPHILIZED, FOR SOLUTION INTRAMUSCULAR; INTRAVENOUS at 09:47

## 2024-05-22 RX ADMIN — SODIUM CHLORIDE, PRESERVATIVE FREE 10 ML: 5 INJECTION INTRAVENOUS at 21:44

## 2024-05-22 RX ADMIN — NAFCILLIN SODIUM 2000 MG: 2 INJECTION, POWDER, LYOPHILIZED, FOR SOLUTION INTRAMUSCULAR; INTRAVENOUS at 21:48

## 2024-05-22 RX ADMIN — NAFCILLIN SODIUM 2000 MG: 2 INJECTION, POWDER, LYOPHILIZED, FOR SOLUTION INTRAMUSCULAR; INTRAVENOUS at 18:15

## 2024-05-22 RX ADMIN — ACETAMINOPHEN 650 MG: 325 TABLET ORAL at 21:44

## 2024-05-22 RX ADMIN — SODIUM CHLORIDE, POTASSIUM CHLORIDE, SODIUM LACTATE AND CALCIUM CHLORIDE 990 ML: 600; 310; 30; 20 INJECTION, SOLUTION INTRAVENOUS at 03:30

## 2024-05-22 RX ADMIN — ACETAMINOPHEN 650 MG: 325 TABLET ORAL at 00:08

## 2024-05-22 RX ADMIN — FUROSEMIDE 40 MG: 10 INJECTION, SOLUTION INTRAMUSCULAR; INTRAVENOUS at 09:48

## 2024-05-22 RX ADMIN — NAFCILLIN SODIUM 2000 MG: 2 INJECTION, POWDER, LYOPHILIZED, FOR SOLUTION INTRAMUSCULAR; INTRAVENOUS at 06:03

## 2024-05-22 ASSESSMENT — PAIN SCALES - GENERAL
PAINLEVEL_OUTOF10: 3
PAINLEVEL_OUTOF10: 0
PAINLEVEL_OUTOF10: 0

## 2024-05-22 ASSESSMENT — PAIN DESCRIPTION - LOCATION: LOCATION: BACK

## 2024-05-22 NOTE — RT PROTOCOL NOTE
RT Nebulizer Bronchodilator Protocol Note    There is a bronchodilator order in the chart from a provider indicating to follow the RT Bronchodilator Protocol and there is an “Initiate RT Bronchodilator Protocol” order as well (see protocol at bottom of note).    CXR Findings:  No results found.    The findings from the last RT Protocol Assessment were as follows:  Smoking: Smoker 15 pack years or more  Respiratory Pattern: Dyspnea on exertion or RR 21-25 bpm  Breath Sounds: Clear breath sounds  Cough: Strong, spontaneous, non-productive  Indication for Bronchodilator Therapy:    Bronchodilator Assessment Score: 3    Aerosolized bronchodilator medication orders have been revised according to the RT Nebulizer Bronchodilator Protocol below.    Respiratory Therapist to perform RT Therapy Protocol Assessment initially then follow the protocol.  Repeat RT Therapy Protocol Assessment PRN for score 0-3 or on second treatment, BID, and PRN for scores above 3.    No Indications - adjust the frequency to every 6 hours PRN wheezing or bronchospasm, if no treatments needed after 48 hours then discontinue using Per Protocol order mode.     If indication present, adjust the RT bronchodilator orders based on the Bronchodilator Assessment Score as indicated below.  If a patient is on this medication at home then do not decrease Frequency below that used at home.    0-3 - enter or revise RT bronchodilator order(s) to equivalent RT Bronchodilator order with Frequency of every 4 hours PRN for wheezing or increased work of breathing using Per Protocol order mode.       4-6 - enter or revise RT Bronchodilator order(s) to two equivalent RT bronchodilator orders with one order with BID Frequency and one order with Frequency of every 4 hours PRN wheezing or increased work of breathing using Per Protocol order mode.         7-10 - enter or revise RT Bronchodilator order(s) to two equivalent RT bronchodilator orders with one order with TID

## 2024-05-22 NOTE — CARE COORDINATION
Discharge Planning  Discharge Planning Note Re: Skilled Nursing     CM noted consult for discharge planning& recommendations for SNF.  CM met with patient . Introduced self and explained role of CM and discharge planning.    Patient is agreeable to SNF on dc.     [place] of choice list was provided with basic dialogue that supports the patient's individualized plan of care/goals, treatment preferences and shares the quality data associated with the providers. [x] Yes [] No.  Patient  have reviewed the provided list and made selections.    Referral made to   [facility]-   Not able to accept because of hx of drug abuse    Larry Schneider in admissions is reviewing  [facility];- Awaiting for call back  [facility] at [facility]-  Awaiting for call back.    Fred / Jorge Luis Camara in admissions is reviewing .  .   CM/SW will follow-up on referrals and provide any additional documentation necessary to facilitate placement.

## 2024-05-22 NOTE — RESULT ENCOUNTER NOTE
Culture result reviewed, no further treatment needed.Chart reviewed.  Current inpatient at [facility].

## 2024-05-23 ENCOUNTER — APPOINTMENT (OUTPATIENT)
Dept: NUCLEAR MEDICINE | Age: 44
DRG: 720 | End: 2024-05-23
Attending: FAMILY MEDICINE
Payer: COMMERCIAL

## 2024-05-23 LAB
ALBUMIN SERPL-MCNC: 1.9 G/DL (ref 3.4–5)
ALP SERPL-CCNC: 74 U/L (ref 40–129)
ALT SERPL-CCNC: 15 U/L (ref 10–40)
ANION GAP SERPL CALCULATED.3IONS-SCNC: 9 MMOL/L (ref 3–16)
AST SERPL-CCNC: 42 U/L (ref 15–37)
BILIRUB DIRECT SERPL-MCNC: 0.8 MG/DL (ref 0–0.3)
BILIRUB INDIRECT SERPL-MCNC: 1 MG/DL (ref 0–1)
BILIRUB SERPL-MCNC: 1.8 MG/DL (ref 0–1)
BUN SERPL-MCNC: 39 MG/DL (ref 7–20)
CALCIUM SERPL-MCNC: 7.4 MG/DL (ref 8.3–10.6)
CHLORIDE SERPL-SCNC: 100 MMOL/L (ref 99–110)
CO2 SERPL-SCNC: 24 MMOL/L (ref 21–32)
CREAT SERPL-MCNC: 1.1 MG/DL (ref 0.9–1.3)
DEPRECATED RDW RBC AUTO: 14.6 % (ref 12.4–15.4)
GFR SERPLBLD CREATININE-BSD FMLA CKD-EPI: 84 ML/MIN/{1.73_M2}
GLUCOSE SERPL-MCNC: 102 MG/DL (ref 70–99)
HCT VFR BLD AUTO: 39.8 % (ref 40.5–52.5)
HGB BLD-MCNC: 13.9 G/DL (ref 13.5–17.5)
MCH RBC QN AUTO: 31.5 PG (ref 26–34)
MCHC RBC AUTO-ENTMCNC: 34.8 G/DL (ref 31–36)
MCV RBC AUTO: 90.7 FL (ref 80–100)
PLATELET # BLD AUTO: 94 K/UL (ref 135–450)
PMV BLD AUTO: 9.5 FL (ref 5–10.5)
POTASSIUM SERPL-SCNC: 4.7 MMOL/L (ref 3.5–5.1)
PROT SERPL-MCNC: 6.4 G/DL (ref 6.4–8.2)
RBC # BLD AUTO: 4.39 M/UL (ref 4.2–5.9)
REPORT: NORMAL
SODIUM SERPL-SCNC: 133 MMOL/L (ref 136–145)
WBC # BLD AUTO: 21.2 K/UL (ref 4–11)

## 2024-05-23 PROCEDURE — 94761 N-INVAS EAR/PLS OXIMETRY MLT: CPT

## 2024-05-23 PROCEDURE — 2580000003 HC RX 258: Performed by: INTERNAL MEDICINE

## 2024-05-23 PROCEDURE — 80076 HEPATIC FUNCTION PANEL: CPT

## 2024-05-23 PROCEDURE — 2500000003 HC RX 250 WO HCPCS: Performed by: INTERNAL MEDICINE

## 2024-05-23 PROCEDURE — 99233 SBSQ HOSP IP/OBS HIGH 50: CPT | Performed by: INTERNAL MEDICINE

## 2024-05-23 PROCEDURE — 6370000000 HC RX 637 (ALT 250 FOR IP): Performed by: STUDENT IN AN ORGANIZED HEALTH CARE EDUCATION/TRAINING PROGRAM

## 2024-05-23 PROCEDURE — 2060000000 HC ICU INTERMEDIATE R&B

## 2024-05-23 PROCEDURE — 78227 HEPATOBIL SYST IMAGE W/DRUG: CPT

## 2024-05-23 PROCEDURE — 78226 HEPATOBILIARY SYSTEM IMAGING: CPT

## 2024-05-23 PROCEDURE — 2580000003 HC RX 258: Performed by: NURSE PRACTITIONER

## 2024-05-23 PROCEDURE — 6360000002 HC RX W HCPCS: Performed by: STUDENT IN AN ORGANIZED HEALTH CARE EDUCATION/TRAINING PROGRAM

## 2024-05-23 PROCEDURE — 36415 COLL VENOUS BLD VENIPUNCTURE: CPT

## 2024-05-23 PROCEDURE — A9537 TC99M MEBROFENIN: HCPCS | Performed by: INTERNAL MEDICINE

## 2024-05-23 PROCEDURE — 3430000000 HC RX DIAGNOSTIC RADIOPHARMACEUTICAL: Performed by: INTERNAL MEDICINE

## 2024-05-23 PROCEDURE — 6360000004 HC RX CONTRAST MEDICATION: Performed by: INTERNAL MEDICINE

## 2024-05-23 PROCEDURE — 85027 COMPLETE CBC AUTOMATED: CPT

## 2024-05-23 PROCEDURE — 80048 BASIC METABOLIC PNL TOTAL CA: CPT

## 2024-05-23 RX ORDER — SODIUM CHLORIDE 9 MG/ML
INJECTION, SOLUTION INTRAVENOUS CONTINUOUS
Status: DISCONTINUED | OUTPATIENT
Start: 2024-05-23 | End: 2024-05-25

## 2024-05-23 RX ORDER — SINCALIDE 5 UG/5ML
0.02 INJECTION, POWDER, LYOPHILIZED, FOR SOLUTION INTRAVENOUS ONCE
Status: COMPLETED | OUTPATIENT
Start: 2024-05-23 | End: 2024-05-23

## 2024-05-23 RX ORDER — SODIUM CHLORIDE 0.9 % (FLUSH) 0.9 %
10 SYRINGE (ML) INJECTION PRN
Status: DISCONTINUED | OUTPATIENT
Start: 2024-05-23 | End: 2024-05-27 | Stop reason: HOSPADM

## 2024-05-23 RX ORDER — SODIUM CHLORIDE 9 MG/ML
INJECTION, SOLUTION INTRAVENOUS CONTINUOUS
Status: DISCONTINUED | OUTPATIENT
Start: 2024-05-23 | End: 2024-05-23

## 2024-05-23 RX ADMIN — NAFCILLIN SODIUM 2000 MG: 2 INJECTION, POWDER, LYOPHILIZED, FOR SOLUTION INTRAMUSCULAR; INTRAVENOUS at 13:12

## 2024-05-23 RX ADMIN — ACETAMINOPHEN 650 MG: 325 TABLET ORAL at 04:57

## 2024-05-23 RX ADMIN — ENOXAPARIN SODIUM 40 MG: 100 INJECTION SUBCUTANEOUS at 10:21

## 2024-05-23 RX ADMIN — NAFCILLIN SODIUM 2000 MG: 2 INJECTION, POWDER, LYOPHILIZED, FOR SOLUTION INTRAMUSCULAR; INTRAVENOUS at 10:01

## 2024-05-23 RX ADMIN — NAFCILLIN SODIUM 2000 MG: 2 INJECTION, POWDER, LYOPHILIZED, FOR SOLUTION INTRAMUSCULAR; INTRAVENOUS at 21:23

## 2024-05-23 RX ADMIN — SODIUM CHLORIDE: 9 INJECTION, SOLUTION INTRAVENOUS at 08:14

## 2024-05-23 RX ADMIN — Medication 10 ML: at 07:21

## 2024-05-23 RX ADMIN — SODIUM CHLORIDE: 9 INJECTION, SOLUTION INTRAVENOUS at 09:57

## 2024-05-23 RX ADMIN — ACETAMINOPHEN 650 MG: 325 TABLET ORAL at 13:45

## 2024-05-23 RX ADMIN — Medication 5.44 MILLICURIE: at 07:21

## 2024-05-23 RX ADMIN — SODIUM CHLORIDE: 9 INJECTION, SOLUTION INTRAVENOUS at 17:43

## 2024-05-23 RX ADMIN — SODIUM CHLORIDE: 9 INJECTION, SOLUTION INTRAVENOUS at 21:33

## 2024-05-23 RX ADMIN — NAFCILLIN SODIUM 2000 MG: 2 INJECTION, POWDER, LYOPHILIZED, FOR SOLUTION INTRAMUSCULAR; INTRAVENOUS at 17:44

## 2024-05-23 RX ADMIN — SINCALIDE 1.54 MCG: 5 INJECTION, POWDER, LYOPHILIZED, FOR SOLUTION INTRAVENOUS at 08:13

## 2024-05-23 RX ADMIN — NAFCILLIN SODIUM 2000 MG: 2 INJECTION, POWDER, LYOPHILIZED, FOR SOLUTION INTRAMUSCULAR; INTRAVENOUS at 04:50

## 2024-05-23 RX ADMIN — SODIUM CHLORIDE, PRESERVATIVE FREE 10 ML: 5 INJECTION INTRAVENOUS at 22:26

## 2024-05-23 ASSESSMENT — PAIN DESCRIPTION - DESCRIPTORS
DESCRIPTORS: ACHING
DESCRIPTORS: ACHING

## 2024-05-23 ASSESSMENT — PAIN DESCRIPTION - LOCATION
LOCATION: BACK
LOCATION: ARM

## 2024-05-23 ASSESSMENT — PAIN SCALES - GENERAL
PAINLEVEL_OUTOF10: 0
PAINLEVEL_OUTOF10: 3
PAINLEVEL_OUTOF10: 0
PAINLEVEL_OUTOF10: 0
PAINLEVEL_OUTOF10: 1
PAINLEVEL_OUTOF10: 3

## 2024-05-23 ASSESSMENT — PAIN DESCRIPTION - ORIENTATION
ORIENTATION: POSTERIOR;LOWER
ORIENTATION: RIGHT

## 2024-05-23 NOTE — CARE COORDINATION
Discharge Planning  SNF Referrals ;  [facility]-   Not able to accept because of hx of drug abuse     [facility]-  Can accept patient as per Chelle ( [phone]) in admissions    [facility];- Not able to accept patient because of hx of drug abuse per Maritza in admissions  [facility] at [facility]-  Not able to accept .     [facility] [place]-   Can accept patient.     Patient was updated , he stated he would like to go to [facility].    Jax with [facility] Admissions was updated & is initiating the Precert.

## 2024-05-23 NOTE — CARE COORDINATION
Discharge Planning  CM received a call from Mi ( [phone]) patient's CM with [facility] requesting for updates on  discharge plan. She requested  to be contacted incase of any issues with discharge.

## 2024-05-24 PROBLEM — E44.1 MILD MALNUTRITION (HCC): Chronic | Status: ACTIVE | Noted: 2024-05-24

## 2024-05-24 LAB
ALBUMIN SERPL-MCNC: 1.8 G/DL (ref 3.4–5)
ALP SERPL-CCNC: 71 U/L (ref 40–129)
ALT SERPL-CCNC: 15 U/L (ref 10–40)
ANION GAP SERPL CALCULATED.3IONS-SCNC: 9 MMOL/L (ref 3–16)
AST SERPL-CCNC: 44 U/L (ref 15–37)
BILIRUB DIRECT SERPL-MCNC: 1 MG/DL (ref 0–0.3)
BILIRUB INDIRECT SERPL-MCNC: 0.8 MG/DL (ref 0–1)
BILIRUB SERPL-MCNC: 1.8 MG/DL (ref 0–1)
BUN SERPL-MCNC: 39 MG/DL (ref 7–20)
CALCIUM SERPL-MCNC: 7.5 MG/DL (ref 8.3–10.6)
CHLORIDE SERPL-SCNC: 105 MMOL/L (ref 99–110)
CO2 SERPL-SCNC: 20 MMOL/L (ref 21–32)
CREAT SERPL-MCNC: 1 MG/DL (ref 0.9–1.3)
DEPRECATED RDW RBC AUTO: 14.9 % (ref 12.4–15.4)
GFR SERPLBLD CREATININE-BSD FMLA CKD-EPI: >90 ML/MIN/{1.73_M2}
GLUCOSE SERPL-MCNC: 111 MG/DL (ref 70–99)
HCT VFR BLD AUTO: 40.3 % (ref 40.5–52.5)
HGB BLD-MCNC: 13.7 G/DL (ref 13.5–17.5)
MCH RBC QN AUTO: 31.1 PG (ref 26–34)
MCHC RBC AUTO-ENTMCNC: 34 G/DL (ref 31–36)
MCV RBC AUTO: 91.5 FL (ref 80–100)
PLATELET # BLD AUTO: 164 K/UL (ref 135–450)
PMV BLD AUTO: 9.1 FL (ref 5–10.5)
POTASSIUM SERPL-SCNC: 4.7 MMOL/L (ref 3.5–5.1)
PROT SERPL-MCNC: 6.5 G/DL (ref 6.4–8.2)
RBC # BLD AUTO: 4.4 M/UL (ref 4.2–5.9)
SODIUM SERPL-SCNC: 134 MMOL/L (ref 136–145)
WBC # BLD AUTO: 25.6 K/UL (ref 4–11)

## 2024-05-24 PROCEDURE — 6370000000 HC RX 637 (ALT 250 FOR IP): Performed by: INTERNAL MEDICINE

## 2024-05-24 PROCEDURE — 99233 SBSQ HOSP IP/OBS HIGH 50: CPT | Performed by: INTERNAL MEDICINE

## 2024-05-24 PROCEDURE — 36415 COLL VENOUS BLD VENIPUNCTURE: CPT

## 2024-05-24 PROCEDURE — 6360000002 HC RX W HCPCS: Performed by: INTERNAL MEDICINE

## 2024-05-24 PROCEDURE — 85027 COMPLETE CBC AUTOMATED: CPT

## 2024-05-24 PROCEDURE — 2580000003 HC RX 258: Performed by: INTERNAL MEDICINE

## 2024-05-24 PROCEDURE — 2500000003 HC RX 250 WO HCPCS: Performed by: INTERNAL MEDICINE

## 2024-05-24 PROCEDURE — 2060000000 HC ICU INTERMEDIATE R&B

## 2024-05-24 PROCEDURE — 99232 SBSQ HOSP IP/OBS MODERATE 35: CPT | Performed by: INTERNAL MEDICINE

## 2024-05-24 PROCEDURE — 87040 BLOOD CULTURE FOR BACTERIA: CPT

## 2024-05-24 PROCEDURE — 80048 BASIC METABOLIC PNL TOTAL CA: CPT

## 2024-05-24 PROCEDURE — 6370000000 HC RX 637 (ALT 250 FOR IP): Performed by: STUDENT IN AN ORGANIZED HEALTH CARE EDUCATION/TRAINING PROGRAM

## 2024-05-24 PROCEDURE — 80076 HEPATIC FUNCTION PANEL: CPT

## 2024-05-24 PROCEDURE — 2580000003 HC RX 258: Performed by: NURSE PRACTITIONER

## 2024-05-24 RX ADMIN — ACETAMINOPHEN 650 MG: 325 TABLET ORAL at 22:47

## 2024-05-24 RX ADMIN — ACETAMINOPHEN 650 MG: 325 TABLET ORAL at 00:01

## 2024-05-24 RX ADMIN — SODIUM CHLORIDE 25 ML: 9 INJECTION, SOLUTION INTRAVENOUS at 01:02

## 2024-05-24 RX ADMIN — SODIUM CHLORIDE, PRESERVATIVE FREE 10 ML: 5 INJECTION INTRAVENOUS at 08:38

## 2024-05-24 RX ADMIN — NAFCILLIN SODIUM 2000 MG: 2 INJECTION, POWDER, LYOPHILIZED, FOR SOLUTION INTRAMUSCULAR; INTRAVENOUS at 05:39

## 2024-05-24 RX ADMIN — SODIUM CHLORIDE: 9 INJECTION, SOLUTION INTRAVENOUS at 03:37

## 2024-05-24 RX ADMIN — SODIUM CHLORIDE: 9 INJECTION, SOLUTION INTRAVENOUS at 15:29

## 2024-05-24 RX ADMIN — VANCOMYCIN HYDROCHLORIDE 1500 MG: 1.5 INJECTION, POWDER, LYOPHILIZED, FOR SOLUTION INTRAVENOUS at 15:32

## 2024-05-24 RX ADMIN — NAFCILLIN SODIUM 2000 MG: 2 INJECTION, POWDER, LYOPHILIZED, FOR SOLUTION INTRAMUSCULAR; INTRAVENOUS at 18:32

## 2024-05-24 RX ADMIN — SODIUM CHLORIDE, PRESERVATIVE FREE 10 ML: 5 INJECTION INTRAVENOUS at 20:44

## 2024-05-24 RX ADMIN — ACETAMINOPHEN 650 MG: 325 TABLET ORAL at 17:39

## 2024-05-24 RX ADMIN — NAFCILLIN SODIUM 2000 MG: 2 INJECTION, POWDER, LYOPHILIZED, FOR SOLUTION INTRAMUSCULAR; INTRAVENOUS at 01:05

## 2024-05-24 RX ADMIN — NAFCILLIN SODIUM 2000 MG: 2 INJECTION, POWDER, LYOPHILIZED, FOR SOLUTION INTRAMUSCULAR; INTRAVENOUS at 14:10

## 2024-05-24 RX ADMIN — NAFCILLIN SODIUM 2000 MG: 2 INJECTION, POWDER, LYOPHILIZED, FOR SOLUTION INTRAMUSCULAR; INTRAVENOUS at 21:32

## 2024-05-24 RX ADMIN — NAFCILLIN SODIUM 2000 MG: 2 INJECTION, POWDER, LYOPHILIZED, FOR SOLUTION INTRAMUSCULAR; INTRAVENOUS at 09:31

## 2024-05-24 ASSESSMENT — PAIN DESCRIPTION - DESCRIPTORS: DESCRIPTORS: SORE

## 2024-05-24 ASSESSMENT — PAIN SCALES - GENERAL
PAINLEVEL_OUTOF10: 6
PAINLEVEL_OUTOF10: 0
PAINLEVEL_OUTOF10: 6
PAINLEVEL_OUTOF10: 0
PAINLEVEL_OUTOF10: 2
PAINLEVEL_OUTOF10: 0

## 2024-05-24 ASSESSMENT — PAIN DESCRIPTION - LOCATION
LOCATION: GENERALIZED
LOCATION: GENERALIZED

## 2024-05-24 ASSESSMENT — PAIN DESCRIPTION - PAIN TYPE: TYPE: ACUTE PAIN

## 2024-05-25 ENCOUNTER — APPOINTMENT (OUTPATIENT)
Dept: GENERAL RADIOLOGY | Age: 44
DRG: 720 | End: 2024-05-25
Attending: FAMILY MEDICINE
Payer: COMMERCIAL

## 2024-05-25 ENCOUNTER — APPOINTMENT (OUTPATIENT)
Dept: CT IMAGING | Age: 44
DRG: 720 | End: 2024-05-25
Attending: FAMILY MEDICINE
Payer: COMMERCIAL

## 2024-05-25 LAB
ALBUMIN SERPL-MCNC: 1.7 G/DL (ref 3.4–5)
ALP SERPL-CCNC: 63 U/L (ref 40–129)
ALT SERPL-CCNC: 15 U/L (ref 10–40)
ANION GAP SERPL CALCULATED.3IONS-SCNC: 10 MMOL/L (ref 3–16)
AST SERPL-CCNC: 42 U/L (ref 15–37)
BACTERIA BLD CULT ORG #2: NORMAL
BACTERIA BLD CULT: NORMAL
BILIRUB DIRECT SERPL-MCNC: 0.9 MG/DL (ref 0–0.3)
BILIRUB INDIRECT SERPL-MCNC: 0.6 MG/DL (ref 0–1)
BILIRUB SERPL-MCNC: 1.5 MG/DL (ref 0–1)
BUN SERPL-MCNC: 29 MG/DL (ref 7–20)
CALCIUM SERPL-MCNC: 7 MG/DL (ref 8.3–10.6)
CHLORIDE SERPL-SCNC: 107 MMOL/L (ref 99–110)
CO2 SERPL-SCNC: 18 MMOL/L (ref 21–32)
CREAT SERPL-MCNC: 0.9 MG/DL (ref 0.9–1.3)
CRP SERPL-MCNC: 63.7 MG/L (ref 0–5.1)
DEPRECATED RDW RBC AUTO: 14.8 % (ref 12.4–15.4)
ERYTHROCYTE [SEDIMENTATION RATE] IN BLOOD BY WESTERGREN METHOD: 19 MM/HR (ref 0–15)
GFR SERPLBLD CREATININE-BSD FMLA CKD-EPI: >90 ML/MIN/{1.73_M2}
GLUCOSE SERPL-MCNC: 114 MG/DL (ref 70–99)
HCT VFR BLD AUTO: 39.1 % (ref 40.5–52.5)
HGB BLD-MCNC: 13.4 G/DL (ref 13.5–17.5)
MCH RBC QN AUTO: 31.6 PG (ref 26–34)
MCHC RBC AUTO-ENTMCNC: 34.2 G/DL (ref 31–36)
MCV RBC AUTO: 92.4 FL (ref 80–100)
NT-PROBNP SERPL-MCNC: 2449 PG/ML (ref 0–124)
PLATELET # BLD AUTO: 182 K/UL (ref 135–450)
PMV BLD AUTO: 8.5 FL (ref 5–10.5)
POTASSIUM SERPL-SCNC: 3.6 MMOL/L (ref 3.5–5.1)
PROT SERPL-MCNC: 5.9 G/DL (ref 6.4–8.2)
RBC # BLD AUTO: 4.23 M/UL (ref 4.2–5.9)
SODIUM SERPL-SCNC: 135 MMOL/L (ref 136–145)
VANCOMYCIN SERPL-MCNC: 36.1 UG/ML
WBC # BLD AUTO: 17.9 K/UL (ref 4–11)

## 2024-05-25 PROCEDURE — 80202 ASSAY OF VANCOMYCIN: CPT

## 2024-05-25 PROCEDURE — 2580000003 HC RX 258: Performed by: INTERNAL MEDICINE

## 2024-05-25 PROCEDURE — 86140 C-REACTIVE PROTEIN: CPT

## 2024-05-25 PROCEDURE — 2580000003 HC RX 258: Performed by: STUDENT IN AN ORGANIZED HEALTH CARE EDUCATION/TRAINING PROGRAM

## 2024-05-25 PROCEDURE — 6370000000 HC RX 637 (ALT 250 FOR IP): Performed by: INTERNAL MEDICINE

## 2024-05-25 PROCEDURE — 2580000003 HC RX 258: Performed by: NURSE PRACTITIONER

## 2024-05-25 PROCEDURE — 2500000003 HC RX 250 WO HCPCS: Performed by: INTERNAL MEDICINE

## 2024-05-25 PROCEDURE — 6370000000 HC RX 637 (ALT 250 FOR IP): Performed by: STUDENT IN AN ORGANIZED HEALTH CARE EDUCATION/TRAINING PROGRAM

## 2024-05-25 PROCEDURE — 94640 AIRWAY INHALATION TREATMENT: CPT

## 2024-05-25 PROCEDURE — 2060000000 HC ICU INTERMEDIATE R&B

## 2024-05-25 PROCEDURE — 36415 COLL VENOUS BLD VENIPUNCTURE: CPT

## 2024-05-25 PROCEDURE — 6360000002 HC RX W HCPCS: Performed by: INTERNAL MEDICINE

## 2024-05-25 PROCEDURE — 80076 HEPATIC FUNCTION PANEL: CPT

## 2024-05-25 PROCEDURE — 85652 RBC SED RATE AUTOMATED: CPT

## 2024-05-25 PROCEDURE — 80048 BASIC METABOLIC PNL TOTAL CA: CPT

## 2024-05-25 PROCEDURE — 71250 CT THORAX DX C-: CPT

## 2024-05-25 PROCEDURE — 6360000002 HC RX W HCPCS: Performed by: STUDENT IN AN ORGANIZED HEALTH CARE EDUCATION/TRAINING PROGRAM

## 2024-05-25 PROCEDURE — 85027 COMPLETE CBC AUTOMATED: CPT

## 2024-05-25 PROCEDURE — 71045 X-RAY EXAM CHEST 1 VIEW: CPT

## 2024-05-25 PROCEDURE — 83880 ASSAY OF NATRIURETIC PEPTIDE: CPT

## 2024-05-25 PROCEDURE — 94761 N-INVAS EAR/PLS OXIMETRY MLT: CPT

## 2024-05-25 RX ORDER — FUROSEMIDE 10 MG/ML
40 INJECTION INTRAMUSCULAR; INTRAVENOUS 2 TIMES DAILY
Status: DISCONTINUED | OUTPATIENT
Start: 2024-05-25 | End: 2024-05-27 | Stop reason: HOSPADM

## 2024-05-25 RX ADMIN — SODIUM CHLORIDE 25 ML: 9 INJECTION, SOLUTION INTRAVENOUS at 21:56

## 2024-05-25 RX ADMIN — NAFCILLIN SODIUM 2000 MG: 2 INJECTION, POWDER, LYOPHILIZED, FOR SOLUTION INTRAMUSCULAR; INTRAVENOUS at 14:27

## 2024-05-25 RX ADMIN — VANCOMYCIN HYDROCHLORIDE 1500 MG: 1.5 INJECTION, POWDER, LYOPHILIZED, FOR SOLUTION INTRAVENOUS at 02:45

## 2024-05-25 RX ADMIN — FUROSEMIDE 40 MG: 10 INJECTION, SOLUTION INTRAMUSCULAR; INTRAVENOUS at 14:22

## 2024-05-25 RX ADMIN — SODIUM CHLORIDE, PRESERVATIVE FREE 10 ML: 5 INJECTION INTRAVENOUS at 20:34

## 2024-05-25 RX ADMIN — NAFCILLIN SODIUM 2000 MG: 2 INJECTION, POWDER, LYOPHILIZED, FOR SOLUTION INTRAMUSCULAR; INTRAVENOUS at 10:28

## 2024-05-25 RX ADMIN — ENOXAPARIN SODIUM 40 MG: 100 INJECTION SUBCUTANEOUS at 08:52

## 2024-05-25 RX ADMIN — NAFCILLIN SODIUM 2000 MG: 2 INJECTION, POWDER, LYOPHILIZED, FOR SOLUTION INTRAMUSCULAR; INTRAVENOUS at 18:34

## 2024-05-25 RX ADMIN — ACETAMINOPHEN 650 MG: 325 TABLET ORAL at 08:48

## 2024-05-25 RX ADMIN — NAFCILLIN SODIUM 2000 MG: 2 INJECTION, POWDER, LYOPHILIZED, FOR SOLUTION INTRAMUSCULAR; INTRAVENOUS at 05:29

## 2024-05-25 RX ADMIN — ACETAMINOPHEN 650 MG: 325 TABLET ORAL at 18:29

## 2024-05-25 RX ADMIN — Medication 2 PUFF: at 04:41

## 2024-05-25 RX ADMIN — VANCOMYCIN HYDROCHLORIDE 1000 MG: 1 INJECTION, POWDER, LYOPHILIZED, FOR SOLUTION INTRAVENOUS at 15:39

## 2024-05-25 RX ADMIN — SODIUM CHLORIDE, PRESERVATIVE FREE 10 ML: 5 INJECTION INTRAVENOUS at 08:53

## 2024-05-25 RX ADMIN — NAFCILLIN SODIUM 2000 MG: 2 INJECTION, POWDER, LYOPHILIZED, FOR SOLUTION INTRAMUSCULAR; INTRAVENOUS at 01:16

## 2024-05-25 RX ADMIN — METHOCARBAMOL 750 MG: 750 TABLET ORAL at 10:37

## 2024-05-25 RX ADMIN — METHOCARBAMOL 750 MG: 750 TABLET ORAL at 22:00

## 2024-05-25 RX ADMIN — SODIUM CHLORIDE: 9 INJECTION, SOLUTION INTRAVENOUS at 10:27

## 2024-05-25 RX ADMIN — NAFCILLIN SODIUM 2000 MG: 2 INJECTION, POWDER, LYOPHILIZED, FOR SOLUTION INTRAMUSCULAR; INTRAVENOUS at 21:57

## 2024-05-25 ASSESSMENT — PAIN SCALES - GENERAL
PAINLEVEL_OUTOF10: 0
PAINLEVEL_OUTOF10: 5
PAINLEVEL_OUTOF10: 0
PAINLEVEL_OUTOF10: 3

## 2024-05-25 ASSESSMENT — PAIN DESCRIPTION - ORIENTATION
ORIENTATION: LOWER;MID
ORIENTATION: RIGHT;LEFT

## 2024-05-25 ASSESSMENT — PAIN SCALES - WONG BAKER: WONGBAKER_NUMERICALRESPONSE: NO HURT

## 2024-05-25 ASSESSMENT — PAIN DESCRIPTION - LOCATION
LOCATION: BACK;LEG
LOCATION: BACK

## 2024-05-25 ASSESSMENT — PAIN DESCRIPTION - DESCRIPTORS
DESCRIPTORS: SORE
DESCRIPTORS: DISCOMFORT

## 2024-05-26 ENCOUNTER — APPOINTMENT (OUTPATIENT)
Dept: GENERAL RADIOLOGY | Age: 44
DRG: 720 | End: 2024-05-26
Attending: FAMILY MEDICINE
Payer: COMMERCIAL

## 2024-05-26 VITALS
HEIGHT: 74 IN | HEART RATE: 111 BPM | TEMPERATURE: 98.6 F | WEIGHT: 181.9 LBS | BODY MASS INDEX: 23.34 KG/M2 | DIASTOLIC BLOOD PRESSURE: 34 MMHG | SYSTOLIC BLOOD PRESSURE: 136 MMHG | RESPIRATION RATE: 16 BRPM | OXYGEN SATURATION: 97 %

## 2024-05-26 PROBLEM — J90 PLEURAL EFFUSION ON LEFT: Status: ACTIVE | Noted: 2024-05-26

## 2024-05-26 LAB
ALBUMIN SERPL-MCNC: 1.7 G/DL (ref 3.4–5)
ALP SERPL-CCNC: 72 U/L (ref 40–129)
ALT SERPL-CCNC: 19 U/L (ref 10–40)
ANION GAP SERPL CALCULATED.3IONS-SCNC: 10 MMOL/L (ref 3–16)
APPEARANCE FLUID: NORMAL
AST SERPL-CCNC: 47 U/L (ref 15–37)
BACTERIA BLD CULT ORG #2: ABNORMAL
BACTERIA BLD CULT: ABNORMAL
BACTERIA BLD CULT: ABNORMAL
BDY FLUID QUALITY: NORMAL
BILIRUB DIRECT SERPL-MCNC: 0.6 MG/DL (ref 0–0.3)
BILIRUB INDIRECT SERPL-MCNC: 0.4 MG/DL (ref 0–1)
BILIRUB SERPL-MCNC: 1 MG/DL (ref 0–1)
BUN SERPL-MCNC: 22 MG/DL (ref 7–20)
CALCIUM SERPL-MCNC: 7 MG/DL (ref 8.3–10.6)
CELL COUNT FLUID TYPE: NORMAL
CHLORIDE SERPL-SCNC: 106 MMOL/L (ref 99–110)
CO2 SERPL-SCNC: 18 MMOL/L (ref 21–32)
COLOR FLUID: NORMAL
CREAT SERPL-MCNC: 0.7 MG/DL (ref 0.9–1.3)
DEPRECATED RDW RBC AUTO: 15 % (ref 12.4–15.4)
GFR SERPLBLD CREATININE-BSD FMLA CKD-EPI: >90 ML/MIN/{1.73_M2}
GLUCOSE FLD-MCNC: 106 MG/DL
GLUCOSE SERPL-MCNC: 131 MG/DL (ref 70–99)
HCT VFR BLD AUTO: 34.7 % (ref 40.5–52.5)
HGB BLD-MCNC: 11.7 G/DL (ref 13.5–17.5)
LDH FLD L TO P-CCNC: 522 U/L
LDH SERPL L TO P-CCNC: 464 U/L (ref 100–190)
LYMPHOCYTES NFR FLD: 34 %
MACROPHAGES # FLD: 6 %
MCH RBC QN AUTO: 30.8 PG (ref 26–34)
MCHC RBC AUTO-ENTMCNC: 33.8 G/DL (ref 31–36)
MCV RBC AUTO: 91.2 FL (ref 80–100)
MONOCYTES NFR FLD: 11 %
NEUTROPHIL, FLUID: 49 %
NUC CELL # FLD: 962 /CUMM
ORGANISM: ABNORMAL
PH FLD STRIP: 8 [PH]
PLATELET # BLD AUTO: 227 K/UL (ref 135–450)
PMV BLD AUTO: 8.7 FL (ref 5–10.5)
POTASSIUM SERPL-SCNC: 3.4 MMOL/L (ref 3.5–5.1)
PROT FLD-MCNC: 2.3 G/DL
PROT SERPL-MCNC: 5.7 G/DL (ref 6.4–8.2)
PROT SERPL-MCNC: 5.8 G/DL (ref 6.4–8.2)
RBC # BLD AUTO: 3.81 M/UL (ref 4.2–5.9)
RBC FLUID: NORMAL /CUMM
SODIUM SERPL-SCNC: 134 MMOL/L (ref 136–145)
SPECIMEN SOURCE FLD: NORMAL
WBC # BLD AUTO: 17.6 K/UL (ref 4–11)

## 2024-05-26 PROCEDURE — 87205 SMEAR GRAM STAIN: CPT

## 2024-05-26 PROCEDURE — 89050 BODY FLUID CELL COUNT: CPT

## 2024-05-26 PROCEDURE — 71045 X-RAY EXAM CHEST 1 VIEW: CPT

## 2024-05-26 PROCEDURE — 83986 ASSAY PH BODY FLUID NOS: CPT

## 2024-05-26 PROCEDURE — 2580000003 HC RX 258: Performed by: INTERNAL MEDICINE

## 2024-05-26 PROCEDURE — 2500000003 HC RX 250 WO HCPCS: Performed by: INTERNAL MEDICINE

## 2024-05-26 PROCEDURE — 88305 TISSUE EXAM BY PATHOLOGIST: CPT

## 2024-05-26 PROCEDURE — 6370000000 HC RX 637 (ALT 250 FOR IP): Performed by: INTERNAL MEDICINE

## 2024-05-26 PROCEDURE — 84157 ASSAY OF PROTEIN OTHER: CPT

## 2024-05-26 PROCEDURE — 2580000003 HC RX 258: Performed by: STUDENT IN AN ORGANIZED HEALTH CARE EDUCATION/TRAINING PROGRAM

## 2024-05-26 PROCEDURE — 6360000002 HC RX W HCPCS: Performed by: INTERNAL MEDICINE

## 2024-05-26 PROCEDURE — 80076 HEPATIC FUNCTION PANEL: CPT

## 2024-05-26 PROCEDURE — 88112 CYTOPATH CELL ENHANCE TECH: CPT

## 2024-05-26 PROCEDURE — 6370000000 HC RX 637 (ALT 250 FOR IP): Performed by: STUDENT IN AN ORGANIZED HEALTH CARE EDUCATION/TRAINING PROGRAM

## 2024-05-26 PROCEDURE — 87070 CULTURE OTHR SPECIMN AEROBIC: CPT

## 2024-05-26 PROCEDURE — 36415 COLL VENOUS BLD VENIPUNCTURE: CPT

## 2024-05-26 PROCEDURE — 82945 GLUCOSE OTHER FLUID: CPT

## 2024-05-26 PROCEDURE — 80048 BASIC METABOLIC PNL TOTAL CA: CPT

## 2024-05-26 PROCEDURE — 84155 ASSAY OF PROTEIN SERUM: CPT

## 2024-05-26 PROCEDURE — 85027 COMPLETE CBC AUTOMATED: CPT

## 2024-05-26 PROCEDURE — 87206 SMEAR FLUORESCENT/ACID STAI: CPT

## 2024-05-26 PROCEDURE — 0W9B3ZX DRAINAGE OF LEFT PLEURAL CAVITY, PERCUTANEOUS APPROACH, DIAGNOSTIC: ICD-10-PCS | Performed by: INTERNAL MEDICINE

## 2024-05-26 PROCEDURE — 83615 LACTATE (LD) (LDH) ENZYME: CPT

## 2024-05-26 PROCEDURE — 87116 MYCOBACTERIA CULTURE: CPT

## 2024-05-26 RX ADMIN — SODIUM CHLORIDE, PRESERVATIVE FREE 10 ML: 5 INJECTION INTRAVENOUS at 20:20

## 2024-05-26 RX ADMIN — SODIUM CHLORIDE 25 ML: 9 INJECTION, SOLUTION INTRAVENOUS at 05:51

## 2024-05-26 RX ADMIN — SODIUM CHLORIDE: 9 INJECTION, SOLUTION INTRAVENOUS at 14:52

## 2024-05-26 RX ADMIN — VANCOMYCIN HYDROCHLORIDE 1000 MG: 1 INJECTION, POWDER, LYOPHILIZED, FOR SOLUTION INTRAVENOUS at 16:25

## 2024-05-26 RX ADMIN — VANCOMYCIN HYDROCHLORIDE 1000 MG: 1 INJECTION, POWDER, LYOPHILIZED, FOR SOLUTION INTRAVENOUS at 03:13

## 2024-05-26 RX ADMIN — NAFCILLIN SODIUM 2000 MG: 2 INJECTION, POWDER, LYOPHILIZED, FOR SOLUTION INTRAMUSCULAR; INTRAVENOUS at 18:31

## 2024-05-26 RX ADMIN — ACETAMINOPHEN 650 MG: 325 TABLET ORAL at 04:14

## 2024-05-26 RX ADMIN — FUROSEMIDE 40 MG: 10 INJECTION, SOLUTION INTRAMUSCULAR; INTRAVENOUS at 17:41

## 2024-05-26 RX ADMIN — NAFCILLIN SODIUM 2000 MG: 2 INJECTION, POWDER, LYOPHILIZED, FOR SOLUTION INTRAMUSCULAR; INTRAVENOUS at 09:32

## 2024-05-26 RX ADMIN — SODIUM CHLORIDE 25 ML: 9 INJECTION, SOLUTION INTRAVENOUS at 03:13

## 2024-05-26 RX ADMIN — SODIUM CHLORIDE, PRESERVATIVE FREE 10 ML: 5 INJECTION INTRAVENOUS at 20:21

## 2024-05-26 RX ADMIN — SODIUM CHLORIDE: 9 INJECTION, SOLUTION INTRAVENOUS at 18:31

## 2024-05-26 RX ADMIN — NAFCILLIN SODIUM 2000 MG: 2 INJECTION, POWDER, LYOPHILIZED, FOR SOLUTION INTRAMUSCULAR; INTRAVENOUS at 05:51

## 2024-05-26 RX ADMIN — FUROSEMIDE 40 MG: 10 INJECTION, SOLUTION INTRAMUSCULAR; INTRAVENOUS at 08:35

## 2024-05-26 RX ADMIN — NAFCILLIN SODIUM 2000 MG: 2 INJECTION, POWDER, LYOPHILIZED, FOR SOLUTION INTRAMUSCULAR; INTRAVENOUS at 14:52

## 2024-05-26 RX ADMIN — SODIUM CHLORIDE: 9 INJECTION, SOLUTION INTRAVENOUS at 09:29

## 2024-05-26 RX ADMIN — SODIUM CHLORIDE, PRESERVATIVE FREE 10 ML: 5 INJECTION INTRAVENOUS at 08:36

## 2024-05-26 RX ADMIN — SODIUM CHLORIDE, PRESERVATIVE FREE 10 ML: 5 INJECTION INTRAVENOUS at 14:51

## 2024-05-26 RX ADMIN — ACETAMINOPHEN 650 MG: 325 TABLET ORAL at 16:26

## 2024-05-26 RX ADMIN — SODIUM CHLORIDE, PRESERVATIVE FREE 10 ML: 5 INJECTION INTRAVENOUS at 08:37

## 2024-05-26 ASSESSMENT — PAIN SCALES - GENERAL
PAINLEVEL_OUTOF10: 6
PAINLEVEL_OUTOF10: 0
PAINLEVEL_OUTOF10: 0

## 2024-05-26 ASSESSMENT — PAIN DESCRIPTION - LOCATION: LOCATION: GENERALIZED

## 2024-05-26 ASSESSMENT — PAIN DESCRIPTION - DESCRIPTORS: DESCRIPTORS: SORE

## 2024-05-26 ASSESSMENT — PAIN SCALES - WONG BAKER: WONGBAKER_NUMERICALRESPONSE: NO HURT

## 2024-05-26 NOTE — PLAN OF CARE
[facility]   Progress Note  Cardiology    CC:  Endocarditis    HPI:  About the same. Weak. Aches.       Medications/Labs all Reviewed    Lab Results   Component Value Date    WBC 26.3 (H) 05/21/2024    HGB 14.1 05/21/2024    HCT 40.4 (L) 05/21/2024    MCV 88.4 05/21/2024    PLT 77 (L) 05/21/2024     Lab Results   Component Value Date    CREATININE 1.0 05/21/2024    BUN 34 (H) 05/21/2024     (L) 05/21/2024    K 5.5 (H) 05/21/2024     05/21/2024    CO2 18 (L) 05/21/2024     Lab Results   Component Value Date    INR 0.99 01/28/2018    PROTIME 11.5 01/28/2018        PHYSICAL EXAM   BP (!) 118/40   Pulse (!) 105   Temp 98.9 °F (37.2 °C) (Temporal)   Resp 20   Ht 1.88 m (6' 2\")   Wt 74.1 kg (163 lb 5.8 oz)   SpO2 97%   BMI 20.97 kg/m²    Chronically ill appearing male in NAD  Respiratory:  Resp Assessment: Normal respiratory effort  Resp Auscultation: Clear to auscultation bilaterally   Cardiovascular:  Auscultation: regular rate and rhythm, normal S1S2, soft systolic murmur,   Palpation:  Nl PMI  JVP:  normal  Extremities: No Edema  Abdomen:  Soft, non-tender  Normal bowel sounds  Extremities:   No Cyanosis or Clubbing  Neurological/Psychiatric:  Oriented to time, place, and person  Non-anxious  Skin:   Warm and dry    TELE; (tracings personally reviewed)  sinus      ASSESSMENT     Endocarditis: (Staph aureus)  Definitely acute involving tricuspid valve with TR  Appears chronically severely ill  Aortic and mitral more difficult to tell and both valves appear to function normally.  Antibiotics per ID.  NATALYA today     IVDA:  Long term  Cessation discussed     Thrombocytopenia:  Likely due to endocarditis  Follow            Chapo Mulligan MD, 5/21/2024 10:32 AM        
  Problem: Safety - Adult  Goal: Free from fall injury  5/19/2024 1023 by Mendy Sabillon RN  Outcome: Progressing     Problem: Neurosensory - Adult  Goal: Achieves stable or improved neurological status  5/19/2024 1023 by Mendy Sabillon RN  Outcome: Progressing     Problem: Cardiovascular - Adult  Goal: Maintains optimal cardiac output and hemodynamic stability  5/19/2024 1023 by Mendy Sabillon RN  Outcome: Progressing     Problem: Musculoskeletal - Adult  Goal: Return mobility to safest level of function  5/19/2024 1023 by Mendy Sabillon RN  Outcome: Progressing     Problem: Infection - Adult  Goal: Absence of infection at discharge  5/19/2024 1023 by Mendy Sabillon RN  Outcome: Progressing     
  Problem: Safety - Adult  Goal: Free from fall injury  5/23/2024 0018 by Raquel Mandel RN  Outcome: Progressing     Problem: Discharge Planning  Goal: Discharge to home or other facility with appropriate resources  5/23/2024 0018 by Raquel Mandel RN  Outcome: Progressing     Problem: Skin/Tissue Integrity  Goal: Absence of new skin breakdown  Description: 1.  Monitor for areas of redness and/or skin breakdown  2.  Assess vascular access sites hourly  3.  Every 4-6 hours minimum:  Change oxygen saturation probe site  4.  Every 4-6 hours:  If on nasal continuous positive airway pressure, respiratory therapy assess nares and determine need for appliance change or resting period.  Outcome: Progressing     Problem: Pain  Goal: Verbalizes/displays adequate comfort level or baseline comfort level  Outcome: Progressing     Problem: Neurosensory - Adult  Goal: Achieves stable or improved neurological status  Outcome: Progressing     Problem: Respiratory - Adult  Goal: Achieves optimal ventilation and oxygenation  Outcome: Progressing     Problem: Cardiovascular - Adult  Goal: Maintains optimal cardiac output and hemodynamic stability  Outcome: Progressing     Problem: Skin/Tissue Integrity - Adult  Goal: Incisions, wounds, or drain sites healing without S/S of infection  Outcome: Progressing     Problem: Musculoskeletal - Adult  Goal: Return mobility to safest level of function  Outcome: Progressing     Problem: Musculoskeletal - Adult  Goal: Return ADL status to a safe level of function  Outcome: Progressing     Problem: Gastrointestinal - Adult  Goal: Minimal or absence of nausea and vomiting  Outcome: Progressing     Problem: Gastrointestinal - Adult  Goal: Maintains or returns to baseline bowel function  Outcome: Progressing     Problem: Gastrointestinal - Adult  Goal: Maintains adequate nutritional intake  Outcome: Progressing     
  Problem: Safety - Adult  Goal: Free from fall injury  5/24/2024 1057 by Precious Cummins, RN  Outcome: Progressing     Problem: Discharge Planning  Goal: Discharge to home or other facility with appropriate resources  5/24/2024 1057 by Precious Cummins, RN  Outcome: Progressing     Problem: Skin/Tissue Integrity  Goal: Absence of new skin breakdown  Description: 1.  Monitor for areas of redness and/or skin breakdown  2.  Assess vascular access sites hourly  3.  Every 4-6 hours minimum:  Change oxygen saturation probe site  4.  Every 4-6 hours:  If on nasal continuous positive airway pressure, respiratory therapy assess nares and determine need for appliance change or resting period.  5/24/2024 1057 by Precious Cummins, RN  Outcome: Progressing     Problem: Pain  Goal: Verbalizes/displays adequate comfort level or baseline comfort level  5/24/2024 1057 by Precious Cummins, RN  Outcome: Progressing     
  Problem: Safety - Adult  Goal: Free from fall injury  5/24/2024 2352 by Mercedes Dupont, RN  Outcome: Progressing     Problem: Pain  Goal: Verbalizes/displays adequate comfort level or baseline comfort level  5/24/2024 2352 by Mercedes Dupont, RN  Outcome: Progressing     
  Problem: Safety - Adult  Goal: Free from fall injury  5/26/2024 0336 by Marshall Miranda RN  Outcome: Progressing  5/25/2024 1413 by Precious Cummins RN  Outcome: Progressing     Problem: Discharge Planning  Goal: Discharge to home or other facility with appropriate resources  5/26/2024 0336 by Marshall Miranda RN  Outcome: Progressing  5/25/2024 1413 by Precious Cummins RN  Outcome: Progressing     Problem: Skin/Tissue Integrity  Goal: Absence of new skin breakdown  Description: 1.  Monitor for areas of redness and/or skin breakdown  2.  Assess vascular access sites hourly  3.  Every 4-6 hours minimum:  Change oxygen saturation probe site  4.  Every 4-6 hours:  If on nasal continuous positive airway pressure, respiratory therapy assess nares and determine need for appliance change or resting period.  5/26/2024 0336 by Marshall Miranda RN  Outcome: Progressing  5/25/2024 1413 by Precious Cummins RN  Outcome: Progressing     Problem: Pain  Goal: Verbalizes/displays adequate comfort level or baseline comfort level  5/26/2024 0336 by Marshall Miranda RN  Outcome: Progressing  5/25/2024 1413 by Precious Cummins RN  Outcome: Progressing     Problem: Neurosensory - Adult  Goal: Achieves stable or improved neurological status  5/26/2024 0336 by Marshall Miranda RN  Outcome: Progressing  5/25/2024 1413 by Precious Cummins RN  Outcome: Progressing     Problem: Respiratory - Adult  Goal: Achieves optimal ventilation and oxygenation  5/26/2024 0336 by Marshall Miranda RN  Outcome: Progressing  5/25/2024 1413 by Precious Cummins RN  Outcome: Progressing     Problem: Cardiovascular - Adult  Goal: Maintains optimal cardiac output and hemodynamic stability  5/26/2024 0336 by Marshall Miranda RN  Outcome: Progressing  5/25/2024 1413 by Precious Cummins RN  Outcome: Progressing     Problem: Skin/Tissue Integrity - Adult  Goal: Incisions, wounds, or drain sites healing without S/S of infection  5/26/2024 0336 by Marshall Miranda RN  Outcome: 
  Problem: Safety - Adult  Goal: Free from fall injury  Outcome: Progressing     Problem: Discharge Planning  Goal: Discharge to home or other facility with appropriate resources  Outcome: Progressing     
  Problem: Safety - Adult  Goal: Free from fall injury  Outcome: Progressing     Problem: Discharge Planning  Goal: Discharge to home or other facility with appropriate resources  Outcome: Progressing     Problem: Skin/Tissue Integrity  Goal: Absence of new skin breakdown  Description: 1.  Monitor for areas of redness and/or skin breakdown  2.  Assess vascular access sites hourly  3.  Every 4-6 hours minimum:  Change oxygen saturation probe site  4.  Every 4-6 hours:  If on nasal continuous positive airway pressure, respiratory therapy assess nares and determine need for appliance change or resting period.  Outcome: Progressing     Problem: Pain  Goal: Verbalizes/displays adequate comfort level or baseline comfort level  Outcome: Progressing     Problem: Neurosensory - Adult  Goal: Achieves stable or improved neurological status  Outcome: Progressing     
  Problem: Safety - Adult  Goal: Free from fall injury  Outcome: Progressing     Problem: Discharge Planning  Goal: Discharge to home or other facility with appropriate resources  Outcome: Progressing     Problem: Skin/Tissue Integrity  Goal: Absence of new skin breakdown  Description: 1.  Monitor for areas of redness and/or skin breakdown  2.  Assess vascular access sites hourly  3.  Every 4-6 hours minimum:  Change oxygen saturation probe site  4.  Every 4-6 hours:  If on nasal continuous positive airway pressure, respiratory therapy assess nares and determine need for appliance change or resting period.  Outcome: Progressing     Problem: Pain  Goal: Verbalizes/displays adequate comfort level or baseline comfort level  Outcome: Progressing     Problem: Neurosensory - Adult  Goal: Achieves stable or improved neurological status  Outcome: Progressing     Problem: Respiratory - Adult  Goal: Achieves optimal ventilation and oxygenation  Outcome: Progressing     Problem: Cardiovascular - Adult  Goal: Maintains optimal cardiac output and hemodynamic stability  Outcome: Progressing     Problem: Skin/Tissue Integrity - Adult  Goal: Incisions, wounds, or drain sites healing without S/S of infection  Outcome: Progressing     Problem: Musculoskeletal - Adult  Goal: Return mobility to safest level of function  Outcome: Progressing  Goal: Return ADL status to a safe level of function  Outcome: Progressing     Problem: Gastrointestinal - Adult  Goal: Minimal or absence of nausea and vomiting  Outcome: Progressing  Goal: Maintains or returns to baseline bowel function  Outcome: Progressing  Goal: Maintains adequate nutritional intake  Outcome: Progressing     Problem: Infection - Adult  Goal: Absence of infection at discharge  Outcome: Progressing     
  Problem: Safety - Adult  Goal: Free from fall injury  Outcome: Progressing     Problem: Discharge Planning  Goal: Discharge to home or other facility with appropriate resources  Outcome: Progressing     Problem: Skin/Tissue Integrity  Goal: Absence of new skin breakdown  Description: 1.  Monitor for areas of redness and/or skin breakdown  2.  Assess vascular access sites hourly  3.  Every 4-6 hours minimum:  Change oxygen saturation probe site  4.  Every 4-6 hours:  If on nasal continuous positive airway pressure, respiratory therapy assess nares and determine need for appliance change or resting period.  Outcome: Progressing     Problem: Pain  Goal: Verbalizes/displays adequate comfort level or baseline comfort level  Outcome: Progressing     Problem: Neurosensory - Adult  Goal: Achieves stable or improved neurological status  Outcome: Progressing     Problem: Respiratory - Adult  Goal: Achieves optimal ventilation and oxygenation  Outcome: Progressing     Problem: Cardiovascular - Adult  Goal: Maintains optimal cardiac output and hemodynamic stability  Outcome: Progressing     Problem: Skin/Tissue Integrity - Adult  Goal: Incisions, wounds, or drain sites healing without S/S of infection  Outcome: Progressing     Problem: Musculoskeletal - Adult  Goal: Return mobility to safest level of function  Outcome: Progressing  Goal: Return ADL status to a safe level of function  Outcome: Progressing     Problem: Gastrointestinal - Adult  Goal: Minimal or absence of nausea and vomiting  Outcome: Progressing  Goal: Maintains or returns to baseline bowel function  Outcome: Progressing  Goal: Maintains adequate nutritional intake  Outcome: Progressing     Problem: Infection - Adult  Goal: Absence of infection at discharge  Outcome: Progressing     
  Problem: Safety - Adult  Goal: Free from fall injury  Outcome: Progressing     Problem: Discharge Planning  Goal: Discharge to home or other facility with appropriate resources  Outcome: Progressing     Problem: Skin/Tissue Integrity  Goal: Absence of new skin breakdown  Description: 1.  Monitor for areas of redness and/or skin breakdown  2.  Assess vascular access sites hourly  3.  Every 4-6 hours minimum:  Change oxygen saturation probe site  4.  Every 4-6 hours:  If on nasal continuous positive airway pressure, respiratory therapy assess nares and determine need for appliance change or resting period.  Outcome: Progressing     Problem: Pain  Goal: Verbalizes/displays adequate comfort level or baseline comfort level  Outcome: Progressing     Problem: Neurosensory - Adult  Goal: Achieves stable or improved neurological status  Outcome: Progressing     Problem: Respiratory - Adult  Goal: Achieves optimal ventilation and oxygenation  Outcome: Progressing     Problem: Cardiovascular - Adult  Goal: Maintains optimal cardiac output and hemodynamic stability  Outcome: Progressing     Problem: Skin/Tissue Integrity - Adult  Goal: Incisions, wounds, or drain sites healing without S/S of infection  Outcome: Progressing     Problem: Musculoskeletal - Adult  Goal: Return mobility to safest level of function  Outcome: Progressing  Goal: Return ADL status to a safe level of function  Outcome: Progressing     Problem: Gastrointestinal - Adult  Goal: Minimal or absence of nausea and vomiting  Outcome: Progressing  Goal: Maintains or returns to baseline bowel function  Outcome: Progressing  Goal: Maintains adequate nutritional intake  Outcome: Progressing     Problem: Infection - Adult  Goal: Absence of infection at discharge  Outcome: Progressing     Problem: Chronic Conditions and Co-morbidities  Goal: Patient's chronic conditions and co-morbidity symptoms are monitored and maintained or improved  Outcome: Progressing   
  Problem: Safety - Adult  Goal: Free from fall injury  Outcome: Progressing     Problem: Discharge Planning  Goal: Discharge to home or other facility with appropriate resources  Outcome: Progressing     Problem: Skin/Tissue Integrity  Goal: Absence of new skin breakdown  Description: 1.  Monitor for areas of redness and/or skin breakdown  2.  Assess vascular access sites hourly  3.  Every 4-6 hours minimum:  Change oxygen saturation probe site  4.  Every 4-6 hours:  If on nasal continuous positive airway pressure, respiratory therapy assess nares and determine need for appliance change or resting period.  Outcome: Progressing     Problem: Pain  Goal: Verbalizes/displays adequate comfort level or baseline comfort level  Outcome: Progressing     Problem: Neurosensory - Adult  Goal: Achieves stable or improved neurological status  Outcome: Progressing     Problem: Respiratory - Adult  Goal: Achieves optimal ventilation and oxygenation  Outcome: Progressing     Problem: Cardiovascular - Adult  Goal: Maintains optimal cardiac output and hemodynamic stability  Outcome: Progressing     Problem: Skin/Tissue Integrity - Adult  Goal: Incisions, wounds, or drain sites healing without S/S of infection  Outcome: Progressing     Problem: Musculoskeletal - Adult  Goal: Return mobility to safest level of function  Outcome: Progressing  Goal: Return ADL status to a safe level of function  Outcome: Progressing     Problem: Gastrointestinal - Adult  Goal: Minimal or absence of nausea and vomiting  Outcome: Progressing  Goal: Maintains or returns to baseline bowel function  Outcome: Progressing  Goal: Maintains adequate nutritional intake  Outcome: Progressing     Problem: Infection - Adult  Goal: Absence of infection at discharge  Outcome: Progressing     Problem: Chronic Conditions and Co-morbidities  Goal: Patient's chronic conditions and co-morbidity symptoms are monitored and maintained or improved  Outcome: Progressing   
  Problem: Safety - Adult  Goal: Free from fall injury  Outcome: Progressing     Problem: Discharge Planning  Goal: Discharge to home or other facility with appropriate resources  Outcome: Progressing  Flowsheets (Taken 5/20/2024 2000 by Tan Iglesias, RN)  Discharge to home or other facility with appropriate resources:   Identify barriers to discharge with patient and caregiver   Arrange for needed discharge resources and transportation as appropriate   Identify discharge learning needs (meds, wound care, etc)     
Surgical update:  No plans for surgical intervention at this time. Chart reviewed and discussed with Dr Nguyen. We will continue to follow along, remainder of care per primary team, ID and cardiology.  ZACK Andersno - CNP    
chronic conditions and co-morbidity symptoms are monitored and maintained or improved  Outcome: Progressing

## 2024-05-26 NOTE — PROCEDURES
PROCEDURE NOTE  Date: 5/26/2024   Name: Romeo Handley  YOB: 1980    Procedures      PROCEDURE NOTE    PROCEDURE: BEDSIDE PERCUTANEOUS THORACENTESIS    Date of procedure: 5/26/2024    Indication for procedure: Left  Pleural effusion    Sedation: None  Anaesthesia: Local; Xylocaine 1% 10ml.     Description of procedure: Patient was consented. After discussing risk, benefits and alternative, patient decided to proceed with procedure. A pre-procedure time out was performed verifying correct patient, procedure, site, and positioning. The patient was positioned appropriately for thoracentesis.     The patient's left chest was prepped and draped in sterile fashion.     Ultrasound was used to identify the pleural pocket. 1% Lidocaine was used to anesthetize the surrounding skin area. Introducer needle was used to enter pleural space. Once pleural fluid without air was aspirated the soft catheter was inserted into the pleural space and connected to a drainage bag. Sample of the pleural fluid was also collected and sent for biochemical, microbiological and cytological examination. About 2200 ml of blood tinged pleural fluid was aspirated. Subsequent to this the soft catheter was withdrawn and the puncture site was dressed with a clean dry gauze dressing. Patient's hemodynamics and oxygenation stayed stable throughout the procedure.     Chest Xray was ordered at completion of procedure.     Patient tolerated procedure well and there were no immediate complications noted.    Estimated Blood Loss: <5ml      Prasanna Xavier MD  Pulmonary Critical Care and Sleep Medicine  5/26/2024, 12:36 PM

## 2024-05-26 NOTE — CONSULTS
[facility]   Cardiac Evaluation      Patient: Romeo Handley  YOB: 1980       CC: transfer form [facility] for CTS consult       Referring provider: No primary care provider on file.    History of Present Illness:    Romeo Handley is a 43-year-old male with a past medical history of polysubstance abuse and Hepatitis C who presented to the ED with chief complaint of worsening fever, chills, cough and shortness of breath.  Patient was seen previously in the ED on 5/15 and diagnosed with UTI and pneumonia.  He was discharged home on oral Augmentin, however, blood cultures resulted positive for Staph aureus and he was advised to return to emergency department.  In the ED, patient met sepsis criteria with tachycardia and a white blood cell count of 20.7.  Repeat blood cultures were drawn.  He was started on broad-spectrum IV antibiotics with vancomycin, ceftriaxone and azithromycin.  He was also noted to have lower extremity swelling.  Duplex of bilateral legs ordered.  ID was consulted on admission and transitioned him to Ancef and clindamycin. His urine drug screen on 5/15 was positive for amphetamines, cocaine and fentanyl. He was monitored using COWS protocol. Echo was ordered given bacteremia in setting of known IVDU and did show evidence of multi-valve vegetation.  Decision to transfer to [facility] for CT surgery opinion.         He has thickening and probable vegetations of the TV and AV  and MV.  There is moderate TR and trace AR and MR. LV function is normal. He has pneumonia and bacteremia and UTI with STaph aureus. He has had many skin infections in the past and has needle marks all over his body. Hx of hepatits C.  He had all his teeth removed 2 years ago and is very thin. Nurses tell me he has been eating well here.     He is tachycardic at rest.  Just now developed some chest pain after walking back from the bathroom.     Past Medical History:   has a past 
Nutrition Note    RECOMMENDATIONS  PO Diet: regular, 1500 mL fluid  ONS: begin Magic Cup & Ensure Compact    ASSESSMENT   Consult received for poor po intake x5 days.  Pt states he doesn't care for the food and doesn't have an appetite.  C/o being thersity, explained the fluid restriction.  Encouraged pt to suck on ice or a popsicle vs drinking a cup of water.  Pt agreed to Magic Cup & 4 oz. Ensure Compact.       Malnutrition Status: Mild malnutrition  Social/Environmental Circumstances  Findings of the 6 clinical characteristics of malnutrition:  Energy Intake:  Mild decrease in energy intake (Comment)  Weight Loss:  No significant weight loss     Body Fat Loss:  Mild body fat loss Buccal region   Muscle Mass Loss:  Mild muscle mass loss Clavicles (pectoralis & deltoids)  Fluid Accumulation:  No significant fluid accumulation     Strength:  Not Performed      NUTRITION DIAGNOSIS   Inadequate oral intake related to inadequate protein-energy intake as evidenced by intake 0-25%    Goals: PO intake 50% or greater, by next RD assessment     NUTRITION RELATED FINDINGS  Objective: oriented x2, flat affect, delayed response; Na+134  Wounds: None    CURRENT NUTRITION THERAPIES  ADULT DIET; Regular; 1500 ml     PO Intake: 0%, 1-25%   PO Supplement Intake:None Ordered      ANTHROPOMETRICS  Current Height: 188 cm (6' 2.02\")  Current Weight - Scale: 81.4 kg (179 lb 8 oz)    Ideal Body Weight (IBW): 190 lbs  (86 kg)        BMI: 23      COMPARATIVE STANDARDS  Total Energy Requirements (kcals/day): 1775 - 2485     Protein (g):  97 - 114       Fluid (mL/day):  1500    EDUCATION  Education initiated (fluid restriction)     The patient will be monitored per nutrition standards of care. Consult dietitian if additional nutrition interventions are needed prior to RD reassessment.     MIGUEL ÁNGEL OLIVA, ZEV, LD    Contact: [phone]      
non-focal. Motor strength is 5+/5 in all muscle groups. The patient has a normal sensorium globally.      LABS:  Recent Labs     05/24/24 0448 05/25/24 0447 05/26/24 0446   WBC 25.6* 17.9* 17.6*   HGB 13.7 13.4* 11.7*   HCT 40.3* 39.1* 34.7*    182 227   ALT 15 15 19   AST 44* 42* 47*   * 135* 134*   K 4.7 3.6 3.4*    107 106   CREATININE 1.0 0.9 0.7*   BUN 39* 29* 22*   CO2 20* 18* 18*       Recent Labs     05/24/24 0448 05/25/24 0447 05/26/24 0446   GLUCOSE 111* 114* 131*   CALCIUM 7.5* 7.0* 7.0*   * 135* 134*   K 4.7 3.6 3.4*   CO2 20* 18* 18*    107 106   BUN 39* 29* 22*   CREATININE 1.0 0.9 0.7*       No results for input(s): \"PHART\", \"KSJ4CRM\", \"PO2ART\", \"KCH8NNX\", \"B1WLOXSL\", \"BEART\", \"J4JREVJL\" in the last 72 hours.    Lab Results   Component Value Date    INR 0.99 01/28/2018    INR 1.05 01/21/2018    PROTIME 11.5 01/28/2018    PROTIME 11.9 01/21/2018     Lab Results   Component Value Date/Time    AMYLASE 117 07/11/2011 04:45 PM      No results found for: \"LABA1C\"  No results found for: \"EAG\"  No results found for: \"TSH\", \"D4SAGSC\", \"L1IJGTN\", \"THYROIDAB\", \"FT3\", \"T4FREE\"  Lab Results   Component Value Date    TROPONINI <0.01 01/28/2018      Lab Results   Component Value Date    CRP 63.7 (H) 05/25/2024      No results found for: \"BNP\"   No results found for: \"DDIMER\"   No results found for: \"FERRITIN\"   Lab Results   Component Value Date    LACTA 2.0 05/17/2024           IMAGING:      CT OF THE CHEST WITHOUT CONTRAST 5/25/2024 9:40 am   IMPRESSION:  1. Evolving multifocal bilateral pneumonia with increasing pleural effusions, moderate to large on the left and small on the right.  2. Moderate coronary artery calcification.         IMPRESSION:     Multifocal pneumonia due to septic emboli  Bilateral pleural effusions  Bacteremia  IV drug abuse  Septic emboli to the brain  Tricuspid and aortic valve infective endocarditis    RECOMMENDATION:     Patient was transferred to 
unremarkable. No  diffuse or focal small bowel wall thickening or inflammatory changes evident.  No obstruction is seen.  The appendix is visualized right lower quadrant,  unremarkable in appearance. The colon appears unremarkable.     Pelvis: No pneumoperitoneum.  Small volume ascites.  Unremarkable appearance  of the prostate gland, seminal vesicles and urinary bladder.  No adenopathy.     Peritoneum/Retroperitoneum: Nonspecific abdominopelvic ascites.  Unremarkable  appearance of the aorta and inferior vena cava.  Mildly enlarged catalino  hepatis lymph nodes are 11 and 14 mm.     Bones/Soft Tissues: No acute superficial soft tissue or osseous structure  abnormality evident.     IMPRESSION:  1.  CT CHEST: Patchy, inflammatory pulmonary soft tissue nodules, largest 13  mm, right lower lobe possibly related to septic emboli or areas of pneumonia.  Recommend noncontrast chest CT at 3-6 months or PET-CT imaging for additional  characterization.  2. Moderate volume left pleural effusion with left basilar consolidation  presumably pneumonia.  3. Patchy alveolar consolidation and infiltrate left upper lobe in keeping  with pneumonia.  4.  Pulmonary sequela typical of that seen with smoking, including COPD.  5. CT ABDOMEN/PELVIS: Findings that can be seen with acute cholecystitis  including thick walled and enhancing wall appearance with pericholecystic  fluid.  6. Nonspecific abdominopelvic ascites.  7. Mild hepatic steatosis.  8.  No findings to suggest acute appendicitis; no ureter calculus or  hydronephrosis.  9. Mild catalino hepatis lymph node enlargement is very likely reactive.       Labs:   CBC:   Recent Labs     05/19/24 0723 05/20/24  0453 05/21/24  0429   WBC 18.6* 24.0* 26.3*   HGB 15.5 14.3 14.1   HCT 44.7 42.3 40.4*   MCV 88.1 88.1 88.4   PLT 58* 77* 77*     BMP:   Recent Labs     05/19/24  0723 05/20/24  0453 05/21/24  0429    135* 133*   K 4.3 5.1 5.5*    104 104   CO2 22 20* 18*   BUN 32* 30* 34* 
14.2 14.8 15.5 14.3   HCT 41.2 42.5 44.7 42.3   PLT 77* 73* 58* 77*   MCV 87.8 87.0 88.1 88.1   MCH 30.4 30.2 30.6 29.7   MCHC 34.6 34.7 34.8 33.8   RDW 14.2 13.8 14.0 13.9   BANDSPCT 3 1 5  --         BMP:  Recent Labs     05/18/24  2328 05/19/24  0723 05/20/24  0453   * 137 135*   K 4.3 4.3 5.1    103 104   CO2 20* 22 20*   BUN 32* 32* 30*   CREATININE 0.8* 0.8* 0.8*   CALCIUM 7.9* 7.9* 7.5*   GLUCOSE 108* 115* 112*        Hepatic FunctionPanel:   Lab Results   Component Value Date/Time    ALKPHOS 104 05/20/2024 04:53 AM    ALT 30 05/20/2024 04:53 AM    AST 68 05/20/2024 04:53 AM    PROT 5.8 07/11/2011 04:45 PM    BILITOT 2.0 05/20/2024 04:53 AM    BILIDIR 1.0 05/20/2024 04:53 AM    IBILI 1.0 05/20/2024 04:53 AM       CPK: No results found for: \"CKTOTAL\"  ESR:   Lab Results   Component Value Date    SEDRATE 21 (H) 05/18/2024     CRP:   Lab Results   Component Value Date    CRP 63.9 (H) 05/18/2024         Imaging:    All pertinent images and reports for the current visit were reviewed by me during this visit.  I reviewed the chest x-ray/CT scan/MRI images and independently interpreted the findings and results today.    No orders to display       Outside records:    Labs, Microbiology, Radiology and pertinent results from Care everywhere, if available, were reviewed as a part ofthe consultation.      Problem list:       Patient Active Problem List   Diagnosis Code    Tobacco use disorder-advised to quit F17.200    Drug use-(hx percocet/heroin use) F19.90    Hepatitis C-sent to gi for eval B19.20    Injury of tip of finger of left hand S69.92XA    Pneumonia in infectious disease J18.9    Complicated UTI (urinary tract infection) N39.0    Bacteremia due to methicillin susceptible Staphylococcus aureus (MSSA) R78.81, B95.61    Drug abuse (HCC) F19.10    Sepsis (HCC) A41.9    Bilateral swelling of feet M79.89    Acute bacterial endocarditis I33.0    Endocarditis of tricuspid valve I07.9    LFT elevation

## 2024-05-27 LAB — ACID FAST STN SPEC QL: NORMAL

## 2024-05-27 NOTE — DISCHARGE SUMMARY
Hospital Medicine Discharge Summary    Patient: Romeo Handley     Gender: male  : 1980   Age: 43 y.o.  MRN: [id number]    Admitting Physician: Frantz Oropeza MD  Discharge Physician: Petey Spain MD     Code Status: Full code    Admit Date: 2024   Left AMA Date: 2024      Disposition:  AMA    Discharge Diagnoses:    Active Hospital Problems    Diagnosis Date Noted    Pleural effusion on left [J90] 2024    Mild malnutrition (HCC) [E44.1] 2024    Cerebral septic emboli (HCC) [I76, I66.9] 2024    Aortic valve endocarditis [I35.8] 2024    Acute septic pulmonary embolism without acute cor pulmonale (HCC) [I26.90] 2024    Hyponatremia [E87.1] 2024    Jaundice [R17] 2024    Perforation of leaflet of tricuspid valve [I07.9] 2024    Thrombocytopenia (HCC) [D69.6] 2024    Bandemia [D72.825] 2024    Elevated sed rate [R70.0] 2024    Elevated C-reactive protein (CRP) [R79.82] 2024    Transaminitis [R74.01] 2024    Endocarditis of mitral valve [I05.9] 2024    IV drug abuse (HCC) [F19.10] 2024    Endocarditis of tricuspid valve [I07.9] 2024    Sepsis (HCC) [A41.9] 2024    Complicated UTI (urinary tract infection) [N39.0] 2024    Bacteremia due to methicillin susceptible Staphylococcus aureus (MSSA) [R78.81, B95.61] 2024         Condition at Discharge:   LEFT AMA    Hospital Course:   42 yo M with IV Fentanyl abuse, crack cocaine abuse, tobacco abuse was transferred from [facility] with aortic and tricuspid valve endocarditis, MSSA bacteremia with sepsis and septic emboli.  Followed by Cardio, ID and CTS.       MRI Brain:  1. There is a punctate focus of restricted diffusion within the left superior  cerebellum without convincing associated contrast enhancement.  This may  represent an acute infarct.  2. No acute intracranial abnormality otherwise seen.  3. No convincing abnormal

## 2024-05-27 NOTE — PROGRESS NOTES
[facility]   Progress Note  Cardiology    CC:  Endocarditis    HPI:  More alert. Still weak      Medications/Labs all Reviewed    Lab Results   Component Value Date    WBC 25.6 (H) 05/24/2024    HGB 13.7 05/24/2024    HCT 40.3 (L) 05/24/2024    MCV 91.5 05/24/2024     05/24/2024     Lab Results   Component Value Date    CREATININE 1.0 05/24/2024    BUN 39 (H) 05/24/2024     (L) 05/24/2024    K 4.7 05/24/2024     05/24/2024    CO2 20 (L) 05/24/2024     Lab Results   Component Value Date    INR 0.99 01/28/2018    PROTIME 11.5 01/28/2018        PHYSICAL EXAM   BP (!) 129/48   Pulse (!) 101   Temp 98.3 °F (36.8 °C)   Resp 16   Ht 1.88 m (6' 2\")   Wt 81.4 kg (179 lb 8 oz)   SpO2 96%   BMI 23.05 kg/m²      Thin chronically ill appearing male  Respiratory:  Resp Assessment: Normal respiratory effort  Resp Auscultation: Clear to auscultation bilaterally   Cardiovascular:  Auscultation: regular rate and rhythm, normal S1S2, faint systolic murmur,  Palpation:  Nl PMI  JVP:  normal  Extremities: No Edema  Abdomen:  Soft, non-tender  Normal bowel sounds  Extremities:   No Cyanosis or Clubbing  Neurological/Psychiatric:  Oriented to time, place, and person  Non-anxious  Skin:   Warm and dry    TELE; (tracings personally reviewed)  Sinus tach    ECHO: (personally reviewed) Tricuspid valve with vegetation and TR, Thickening of aortic valve, but not very mobile. Unclear if acute vegetation and aortic valve competent. Mitral valve also with annular echo density, but no MR or obvious mobile veg.    Left Ventricle: Normal left ventricular systolic function with a visually estimated EF of 55 - 60%. Left ventricle size is normal. Normal wall thickness. Normal wall motion. Normal diastolic function.    Right Ventricle: Right ventricle size is normal. Normal systolic function. TAPSE is 2.6 cm.    Aortic Valve: Trace regurgitation. There is a 1.46cm x 1.22cm echodensity seen on the non-coronary 
      Hospitalist Progress Note      PCP: No primary care provider on file.    Date of Admission: 5/18/2024    Chief Complaint: Transfer from [facility] with aortic and tricuspid valve endocarditis    Hospital Course: 44 yo M with IV Fentanyl abuse, crack cocaine abuse, tobacco abuse was transferred from [facility] with aortic and tricuspid valve endocarditis, MSSA bacteremia with sepsis and septic emboli.  Followed by Cardio, ID and CTS.      MRI Brain:  1. There is a punctate focus of restricted diffusion within the left superior  cerebellum without convincing associated contrast enhancement.  This may  represent an acute infarct.  2. No acute intracranial abnormality otherwise seen.  3. No convincing abnormal enhancement within the brain.    NATALYA:      Left Ventricle: Normal left ventricular systolic function with a visually estimated EF of 55 - 60%. Left ventricle size is normal. Normal wall motion.    Aortic Valve: Trileaflet valve. There are vegetations involving the right coronary cusp and the noncoronary cusp.  The vegetation on the noncoronary cusp measures 1.35 cm.  There is aortic insufficiency present that is probably severe.    Mitral Valve: Mild regurgitation.    Tricuspid Valve: Severe regurgitation. There is a 2.69cm large vegetation present.  Vegetations appear to involve at least 2 of the tricuspid valve leaflets    Extracardiac: Large left pleural effusion.    Image quality is adequate.       Subjective:  Patient is less SOB.  Still has HA.  No CP, fevers or abdominal pain.       Medications:  Reviewed    Infusion Medications    lactated ringers IV soln 990 mL (05/22/24 0330)    sodium chloride      sodium chloride       Scheduled Medications    sodium chloride flush  10 mL IntraVENous Once    sodium chloride flush  5-40 mL IntraVENous 2 times per day    nicotine  1 patch TransDERmal Daily    nafcillin  2,000 mg IntraVENous Q4H    sodium chloride flush  5-40 mL IntraVENous 2 times per day    sodium 
      Hospitalist Progress Note      PCP: No primary care provider on file.    Date of Admission: 5/18/2024    Chief Complaint: Transfer from [facility] with aortic and tricuspid valve endocarditis    Hospital Course: 42 yo M with IV Fentanyl abuse, crack cocaine abuse, tobacco abuse was transferred from [facility] with aortic and tricuspid valve endocarditis, MSSA bacteremia with sepsis and septic emboli.  Followed by Cardio, ID and CTS.      MRI Brain:  1. There is a punctate focus of restricted diffusion within the left superior  cerebellum without convincing associated contrast enhancement.  This may  represent an acute infarct.  2. No acute intracranial abnormality otherwise seen.  3. No convincing abnormal enhancement within the brain.    NATALYA:      Left Ventricle: Normal left ventricular systolic function with a visually estimated EF of 55 - 60%. Left ventricle size is normal. Normal wall motion.    Aortic Valve: Trileaflet valve. There are vegetations involving the right coronary cusp and the noncoronary cusp.  The vegetation on the noncoronary cusp measures 1.35 cm.  There is aortic insufficiency present that is probably severe.    Mitral Valve: Mild regurgitation.    Tricuspid Valve: Severe regurgitation. There is a 2.69cm large vegetation present.  Vegetations appear to involve at least 2 of the tricuspid valve leaflets    Extracardiac: Large left pleural effusion.    Image quality is adequate.       Subjective:  Patient feels SOB and has HA.  No CP, fevers or abdominal pain.       Medications:  Reviewed    Infusion Medications    lactated ringers IV soln 75 mL/hr at 05/20/24 2124    sodium chloride      sodium chloride       Scheduled Medications    sodium chloride flush  5-40 mL IntraVENous 2 times per day    sodium chloride flush  5-40 mL IntraVENous 2 times per day    enoxaparin  40 mg SubCUTAneous Daily    albuterol  2.5 mg Nebulization BID RT    ceFAZolin  2,000 mg IntraVENous Q8H     PRN Meds: 
      Hospitalist Progress Note      PCP: No primary care provider on file.    Date of Admission: 5/18/2024    Chief Complaint: Transfer from [facility] with aortic and tricuspid valve endocarditis    Hospital Course: 42 yo M with IV Fentanyl abuse, crack cocaine abuse, tobacco abuse was transferred from [facility] with aortic and tricuspid valve endocarditis, MSSA bacteremia with sepsis and septic emboli.  Followed by Cardio, ID and CTS.      MRI Brain:  1. There is a punctate focus of restricted diffusion within the left superior  cerebellum without convincing associated contrast enhancement.  This may  represent an acute infarct.  2. No acute intracranial abnormality otherwise seen.  3. No convincing abnormal enhancement within the brain.    NATALYA:      Left Ventricle: Normal left ventricular systolic function with a visually estimated EF of 55 - 60%. Left ventricle size is normal. Normal wall motion.    Aortic Valve: Trileaflet valve. There are vegetations involving the right coronary cusp and the noncoronary cusp.  The vegetation on the noncoronary cusp measures 1.35 cm.  There is aortic insufficiency present that is probably severe.    Mitral Valve: Mild regurgitation.    Tricuspid Valve: Severe regurgitation. There is a 2.69cm large vegetation present.  Vegetations appear to involve at least 2 of the tricuspid valve leaflets    Extracardiac: Large left pleural effusion.    Image quality is adequate.     HIDA:  IMPRESSION:  No convincing scintigraphic evidence of acute or chronic cholecystitis.    CT Chest on 5/25/24 :IMPRESSION:  1. Evolving multifocal bilateral pneumonia with increasing pleural effusions,  moderate to large on the left and small on the right.  2. Moderate coronary artery calcification.    S/P L thoracentesis on 5/26 with 2.2 L removed by Pulmonary.    Subjective:  Patient with less SOB.  In chair today.  No CP or abdominal pain. No fevers.      Medications:  Reviewed    Infusion Medications    
      Hospitalist Progress Note      PCP: No primary care provider on file.    Date of Admission: 5/18/2024    Chief Complaint: Transfer from [facility] with aortic and tricuspid valve endocarditis    Hospital Course: 42 yo M with IV Fentanyl abuse, crack cocaine abuse, tobacco abuse was transferred from [facility] with aortic and tricuspid valve endocarditis, MSSA bacteremia with sepsis and septic emboli.  Followed by Cardio, ID and CTS.      MRI Brain:  1. There is a punctate focus of restricted diffusion within the left superior  cerebellum without convincing associated contrast enhancement.  This may  represent an acute infarct.  2. No acute intracranial abnormality otherwise seen.  3. No convincing abnormal enhancement within the brain.    NATALYA:      Left Ventricle: Normal left ventricular systolic function with a visually estimated EF of 55 - 60%. Left ventricle size is normal. Normal wall motion.    Aortic Valve: Trileaflet valve. There are vegetations involving the right coronary cusp and the noncoronary cusp.  The vegetation on the noncoronary cusp measures 1.35 cm.  There is aortic insufficiency present that is probably severe.    Mitral Valve: Mild regurgitation.    Tricuspid Valve: Severe regurgitation. There is a 2.69cm large vegetation present.  Vegetations appear to involve at least 2 of the tricuspid valve leaflets    Extracardiac: Large left pleural effusion.    Image quality is adequate.     HIDA:  IMPRESSION:  No convincing scintigraphic evidence of acute or chronic cholecystitis.        Subjective:  Patient is has GABRIEL.  Hungry.  No CP or abdominal pain. Had fevers last night.      Medications:  Reviewed    Infusion Medications    sodium chloride 100 mL/hr at 05/23/24 0957    lactated ringers IV soln 75 mL/hr at 05/23/24 0445    sodium chloride      sodium chloride       Scheduled Medications    sodium chloride flush  10 mL IntraVENous Once    sodium chloride flush  5-40 mL IntraVENous 2 times per 
      [facility]   Progress Note  Cardiology    CC:  Endocarditis    HPI:  Alert today. Hurts all over. Denies dyspnea. Using drugs x 10 years. \"All of them\". \"Clean\" for 3 years and says he restarted a month ago.      Medications/Labs all Reviewed    Lab Results   Component Value Date    WBC 24.0 (H) 05/20/2024    HGB 14.3 05/20/2024    HCT 42.3 05/20/2024    MCV 88.1 05/20/2024    PLT 77 (L) 05/20/2024     Lab Results   Component Value Date    CREATININE 0.8 (L) 05/20/2024    BUN 30 (H) 05/20/2024     (L) 05/20/2024    K 5.1 05/20/2024     05/20/2024    CO2 20 (L) 05/20/2024     Lab Results   Component Value Date    INR 0.99 01/28/2018    PROTIME 11.5 01/28/2018        PHYSICAL EXAM   BP (!) 107/50   Pulse 90   Temp 97.6 °F (36.4 °C) (Oral)   Resp 16   Ht 1.88 m (6' 2\")   Wt 74.1 kg (163 lb 6.4 oz)   SpO2 96%   BMI 20.98 kg/m²      Thin chronically ill appearing male  Respiratory:  Resp Assessment: Normal respiratory effort  Resp Auscultation: Clear to auscultation bilaterally   Cardiovascular:  Auscultation: regular rate and rhythm, normal S1S2, faint systolic murmur,  Palpation:  Nl PMI  JVP:  normal  Extremities: No Edema  Abdomen:  Soft, non-tender  Normal bowel sounds  Extremities:   No Cyanosis or Clubbing  Neurological/Psychiatric:  Oriented to time, place, and person  Non-anxious  Skin:   Warm and dry    TELE; (tracings personally reviewed)  Sinus tach    ECHO: (personally reviewed) Tricuspid valve with vegetation and TR, Thickening of aortic valve, but not very mobile. Unclear if acute vegetation and aortic valve competent. Mitral valve also with annular echo density, but no MR or obvious mobile veg.    Left Ventricle: Normal left ventricular systolic function with a visually estimated EF of 55 - 60%. Left ventricle size is normal. Normal wall thickness. Normal wall motion. Normal diastolic function.    Right Ventricle: Right ventricle size is normal. Normal systolic 
      [facility]   Progress Note  Cardiology    CC:  Endocarditis    HPI:  Alert today. Hurts all over. Denies dyspnea. Using drugs x 10 years. \"All of them\". \"Clean\" for 3 years and says he restarted a month ago.      Medications/Labs all Reviewed    Lab Results   Component Value Date    WBC 20.3 (H) 05/22/2024    HGB 13.6 05/22/2024    HCT 39.3 (L) 05/22/2024    MCV 89.6 05/22/2024    PLT 70 (L) 05/22/2024     Lab Results   Component Value Date    CREATININE 1.0 05/22/2024    BUN 42 (H) 05/22/2024     (L) 05/22/2024    K 4.6 05/22/2024     05/22/2024    CO2 19 (L) 05/22/2024     Lab Results   Component Value Date    INR 0.99 01/28/2018    PROTIME 11.5 01/28/2018        PHYSICAL EXAM   /68   Pulse (!) 101   Temp 99.5 °F (37.5 °C) (Temporal)   Resp 18   Ht 1.88 m (6' 2\")   Wt 76.8 kg (169 lb 4.8 oz)   SpO2 92%   BMI 21.74 kg/m²      Thin chronically ill appearing male  Respiratory:  Resp Assessment: Normal respiratory effort  Resp Auscultation: Clear to auscultation bilaterally   Cardiovascular:  Auscultation: regular rate and rhythm, normal S1S2, faint systolic murmur,  Palpation:  Nl PMI  JVP:  normal  Extremities: No Edema  Abdomen:  Soft, non-tender  Normal bowel sounds  Extremities:   No Cyanosis or Clubbing  Neurological/Psychiatric:  Oriented to time, place, and person  Non-anxious  Skin:   Warm and dry    TELE; (tracings personally reviewed)  Sinus tach    ECHO: (personally reviewed) Tricuspid valve with vegetation and TR, Thickening of aortic valve, but not very mobile. Unclear if acute vegetation and aortic valve competent. Mitral valve also with annular echo density, but no MR or obvious mobile veg.    Left Ventricle: Normal left ventricular systolic function with a visually estimated EF of 55 - 60%. Left ventricle size is normal. Normal wall thickness. Normal wall motion. Normal diastolic function.    Right Ventricle: Right ventricle size is normal. Normal systolic 
    Clinical Pharmacy Note: Pharmacy to Dose Vancomycin    Romeo Handley is a 43 y.o. male started on Vancomycin for Bloodstream infection; consult received from Dr. Reed  to manage therapy. Also receiving the following antibiotics: Nafcillin.    Goal AUC: 400-600 mg/L*hr  Goal Trough Level: 15-20 mcg/mL    Assessment/Plan:  Initiate vancomycin 1500 mg IV every 12 hours. Bayesian modeling predicts an AUC of 502 mg/L*hr and a trough of 15.2 mcg/mL at steady state concentration.  A vancomycin random level has been ordered for 5/25 at 0600  Changes in regimen will be determined based on culture results, renal function, and clinical response.  Pharmacy will continue to monitor and adjust regimen as necessary.    Allergies:  Patient has no known allergies.     Recent Labs     05/23/24  0433 05/24/24  0448   CREATININE 1.1 1.0       Recent Labs     05/23/24  0433 05/24/24  0448   WBC 21.2* 25.6*       Ht Readings from Last 1 Encounters:   05/18/24 1.88 m (6' 2\")        Wt Readings from Last 1 Encounters:   05/24/24 81.4 kg (179 lb 8 oz)         Estimated Creatinine Clearance: 109 mL/min (based on SCr of 1 mg/dL).      Thank you for the consult,    Starr Mccurdy [facility]  [id number]    
    Clinical Pharmacy Note: Pharmacy to Dose Vancomycin    Vancomycin Day: 2  Indication: bacteremia  Current Dose: 1500mg q12h  Dosing Method: Bayesian Modeling    Random: 36.1 ug/mL    Recent Labs     05/24/24  0448 05/25/24  0447   BUN 39* 29*       Recent Labs     05/24/24  0448 05/25/24  0447   CREATININE 1.0 0.9       Recent Labs     05/24/24  0448 05/25/24  0447   WBC 25.6* 17.9*         Intake/Output Summary (Last 24 hours) at 5/25/2024 0712  Last data filed at 5/25/2024 0629  Gross per 24 hour   Intake 5820.62 ml   Output 1725 ml   Net 4095.62 ml         Ht Readings from Last 1 Encounters:   05/24/24 1.88 m (6' 2.02\")        Wt Readings from Last 1 Encounters:   05/25/24 81.1 kg (178 lb 14.4 oz)         Body mass index is 22.96 kg/m².    Estimated Creatinine Clearance: 120 mL/min (based on SCr of 0.9 mg/dL).      Assessment/Plan:  Vancomycin level is supratherapeutic. Based on Bayesian modeling, predicted AUC is 693 mg/L*hr.   Decrease vancomycin regimen to 1000mg every 12 hours.   Bayesian Modeling predicts an AUC of 467 mg/L*hr and trough of 12.6 mg/L.  A vancomycin random level has been ordered on 5/28 at 0600 for follow-up.  Changes in regimen will be determined based on culture results, renal function, and clinical response.  Pharmacy will continue to monitor and adjust regimen as necessary.    Thank you for the consult,    Laura Powers, PharmD  PGY-1 Pharmacy Resident  [id number]     
    Clinical Pharmacy Note: Positive Blood Culture Documentation    Pharmacy was notified by lab that Romeo Handley has positive blood cultures.    Patient is positive for Staphylococcus aureus in one out of two sets.  Resistance markers present: none    Name of lab caller: Pedro  Name of receiving pharmacist: Yang Dejesus RPh  Time: 2340    Patient's current antimicrobial regimen of nafcillin does provide appropriate coverage. This blood culture result is consistent with patient's known methicillin-susceptible Staphylococcus aureus bacteremia from previous blood cultures.    Thank you,  Yang Dejesus, PharmD  [phone]  
    Date of Admission: 5/18/2024. Hospital Day: 2       Assessment/Plan:  43 y.o. male with pmh of polysubstance use disorder, hep C who presents with chief complaint of weakness. Patient presented to emergency room and was admitted to [facility].  Echo was done and showed vegetation on mitral and tricuspid valve.  Patient was transferred to [facility] for further evaluation.    MSSA bacteremia:  # Sepsis:  -Patient presented with chief complaint of worsening fever, chills, cough, and shortness of breath.  -Blood culture grew MSSA bacteremia  -Echo was done and showed normal EF and diastolic function.   Aortic valve: Trace regurgitation, no stenosis. There is a 1.46cm x 1.22cm echodensity seen on the non-coronary cuspid, consistent with a possible vegetation.  Mitral valve: Trace regurgitation, no stenosis noted, There is a 1.88cm x 1.07cm echodensity present on the anterior leaflet consistent with a vegetation.  Tricuspid valve: At least moderate regurgitation with possible perforation of the tricuspid valve leaflets. No stenosis noted. There is a 2.05cm x 1.06cm echodensity visualized, consistent with a vegetation.  -CRP 63.9, sed rate 21  - cont Ancef  -Repeat blood culture pending  -IV fluid  -ID consulted  -Cardiology consulted     # Diarrhea:  -C. Difficile -ve   -Giardia  -Stool O&P     # Transaminitis  # History of hepatitis C  -Hepatitis panel positive for hepatitis C antibody  -hepatitis panel pending  -Return hepatic function panel     # Polysubstance abuse:  -UDS 5/15 +amphetmaines, cocaine and fentanyl. Last use 5 days ago   -monitor with COWS protocol  -advised cessation     # Tobacco use  -counseled cessation  -prn nicotine replacement              Active Hospital Problems    Diagnosis     Sepsis (HCC) [A41.9]            DVT Prophylaxis:    Diet: ADULT DIET; Regular  Code Status: Full Code      Dispo - home    All  follow up labs and imaging personally reviewed      Chief Complaint: No chief 
    Date of Admission: 5/18/2024. Hospital Day: 3       Assessment/Plan:  43 y.o. male with pmh transferred  for  MSSA bacteremia and TV endocarditis with vegetation. NATALYA pending    MSSA bacteremia:  # Sepsis:  -Blood culture grew MSSA bacteremia , repeat cx 5/18 pending   -Echo NATALYA showed normal EF and diastolic function.   Aortic valve: Trace regurgitation, no stenosis. There is a 1.46cm x 1.22cm echodensity seen on the non-coronary cuspid, consistent with a possible vegetation.  Mitral valve: Trace regurgitation, no stenosis noted, There is a 1.88cm x 1.07cm echodensity present on the anterior leaflet consistent with a vegetation.  Tricuspid valve: At least moderate regurgitation with possible perforation of the tricuspid valve leaflets. No stenosis noted. There is a 2.05cm x 1.06cm echodensity visualized, consistent with a vegetation.  - cont Ancef  -IV fluid  -ID consulted  -Cardiology consulted     # Diarrhea:  -C. Difficile -ve   -Giardia  -Stool O&P     # Transaminitis  # History of hepatitis C  -Hepatitis panel positive for hepatitis C antibody  -hepatitis panel pending  -monitor hepatic function panel     # Polysubstance abuse:  -UDS 5/15 +amphetmaines, cocaine and fentanyl. Last use 5 days ago   -monitor with COWS protocol  -advised cessation     # Tobacco use  -counseled cessation  -prn nicotine replacement              Active Hospital Problems    Diagnosis     Sepsis (HCC) [A41.9]            DVT Prophylaxis:    Diet: Diet NPO Exceptions are: Sips of Water with Meds  Code Status: Full Code      Dispo - home    All  follow up labs and imaging personally reviewed      Chief Complaint: No chief complaint on file.        Interval  History:   5/120 pt sleepy but easily wakes up , no distress, asking for water , discussed about plan for NATALYA today  Wbc up to 24       Medications:  Reviewed    Infusion Medications    sodium chloride       Scheduled Medications    sodium chloride flush  5-40 mL IntraVENous 2 
    Infectious Diseases   Progress Note      Admission Date: 5/18/2024  Hospital Day: Hospital Day: 5   Attending: Petey Spain MD  Date of service: 5/22/2024     Chief complaint/ Reason for consult:     Methicillin-susceptible Staphylococcus aureus bacteremia and infective endocarditis with a 2D echo showing aortic and mitral valve manage lesions and a large tricuspid valve vegetation with concern for tricuspid valve leaflet perforation  Methicillin-susceptible Staphylococcus aureus urinary tract infection, likely secondary to the underlying bacteremia  Active IV drug user, positive urine drug screen for amphetamines, cocaine and fentanyl on 5/15/2024  Worsening leukocytosis  Thrombocytopenia in the setting of bacteremia    Microbiology:        I have reviewed allavailable micro lab data and cultures    Blood culture (2/2) - collected on 5/15/2024 at 5/17/2024: Methicillin-susceptible Staphylococcus aureus    Susceptibility     Staphylococcus aureus     BACTERIAL SUSCEPTIBILITY PANEL BY ANAIS     clindamycin <=0.25 mcg/mL Sensitive     erythromycin <=0.25 mcg/mL Sensitive     levofloxacin <=0.12 mcg/mL Sensitive     oxacillin <=0.25 mcg/mL Sensitive     tetracycline <=1 mcg/mL Sensitive     trimethoprim-sulfamethoxazole <=10 mcg/mL Sensitive      Stool C. difficile test- collected on 5/19/2024: Negative      Antibiotics and immunizations:       Current antibiotics: All antibiotics and their doses were reviewed by me    Recent Abx Admin                     nafcillin 2,000 mg in sodium chloride 0.9 % 100 mL IVPB (mini-bag) (mg) 2,000 mg New Bag 05/22/24 1450     2,000 mg New Bag  0947     2,000 mg New Bag  0603     2,000 mg New Bag  0229     2,000 mg New Bag 05/21/24 2143     2,000 mg New Bag  1856                      Immunization History: All immunization history was reviewed by me today.      There is no immunization history on file for this patient.    Known drug allergies:     All allergies were reviewed and 
    Infectious Diseases   Progress Note      Admission Date: 5/18/2024  Hospital Day: Hospital Day: 6   Attending: Petey Spain MD  Date of service: 5/23/2024     Chief complaint/ Reason for consult:     Methicillin-susceptible Staphylococcus aureus bacteremia and infective endocarditis with a 2D echo showing aortic and mitral valve manage lesions and a large tricuspid valve vegetation with concern for tricuspid valve leaflet perforation  Methicillin-susceptible Staphylococcus aureus urinary tract infection, likely secondary to the underlying bacteremia  Active IV drug user, positive urine drug screen for amphetamines, cocaine and fentanyl on 5/15/2024  Worsening leukocytosis  Thrombocytopenia in the setting of bacteremia    Microbiology:        I have reviewed allavailable micro lab data and cultures    Blood culture (2/2) - collected on 5/15/2024 at 5/17/2024: Methicillin-susceptible Staphylococcus aureus    Susceptibility     Staphylococcus aureus     BACTERIAL SUSCEPTIBILITY PANEL BY ANAIS     clindamycin <=0.25 mcg/mL Sensitive     erythromycin <=0.25 mcg/mL Sensitive     levofloxacin <=0.12 mcg/mL Sensitive     oxacillin <=0.25 mcg/mL Sensitive     tetracycline <=1 mcg/mL Sensitive     trimethoprim-sulfamethoxazole <=10 mcg/mL Sensitive      Stool C. difficile test- collected on 5/19/2024: Negative      Antibiotics and immunizations:       Current antibiotics: All antibiotics and their doses were reviewed by me    Recent Abx Admin                     nafcillin 2,000 mg in sodium chloride 0.9 % 100 mL IVPB (mini-bag) (mg) 2,000 mg New Bag 05/23/24 1312     2,000 mg New Bag  1001     2,000 mg New Bag  0450     2,000 mg New Bag 05/22/24 2148     2,000 mg New Bag  1815                      Immunization History: All immunization history was reviewed by me today.      There is no immunization history on file for this patient.    Known drug allergies:     All allergies were reviewed and updated    No Known 
   05/25/24 1500   RT Protocol   History Pulmonary Disease 1   Respiratory pattern 2   Breath sounds 0   Cough 0   Bronchodilator Assessment Score 3       
0958  Pt returned to room from KAT[facility] Colin, asking if he can eat, will discuss with MD.  I searched his room/belongings to verify there weren't any drugs or paraphernalia d/t his IVDA, as in report I was told his girlfriend visits at times.    1141  pt is calm and cooperative, scoring low on cows assessment, no prn meds given on this shift as of yet, pt denies pain, sob, nausea, sweats/tremors, he does state he is sob on exertion, I encouraged him to get up to chair and he wants to wait until he can eat.  IS given and pt was able to use well along with purposeful cough for pulm toileting.  He states his girlfriend still uses drugs and was part of the reason he started using again, emotional support given.  Spiritual care consult placed.  
[facility]   CONSULTATION  [phone]      Shortness of breath         History of Present Illness:  Romeo Handley is a 43 y.o. patient who presented to the hospital with complaints of shortness of breath. I have been asked to provide consultation regarding further management and testing.    Subjective: no acute events overnight. No chest pain or presssure    Past Medical History:   has a past medical history of CHF (congestive heart failure) (HCC) and Opiate abuse, episodic (HCC).    Surgical History:   has a past surgical history that includes Dental surgery; fracture surgery; and Abdomen surgery.     Social History:   reports that he has been smoking cigarettes. He has never used smokeless tobacco. He reports current drug use. Drugs: IV and Opiates . He reports that he does not drink alcohol.     Family History:  No family history of premature coronary artery disease, aortic disease, or valve disease.    Home Medications:  Were reviewed and are listed in nursing record. and/or listed below  Prior to Admission medications    Medication Sig Start Date End Date Taking? Authorizing Provider   amoxicillin-clavulanate (AUGMENTIN) 875-125 MG per tablet Take 1 tablet by mouth 2 times daily for 10 days  Patient not taking: Reported on 5/18/2024 5/15/24 5/25/24  Noe Boudreaux,    albuterol sulfate HFA (PROVENTIL;VENTOLIN;PROAIR) 108 (90 Base) MCG/ACT inhaler Inhale 2 puffs into the lungs every 4 hours as needed for Wheezing  Patient not taking: Reported on 5/18/2024 5/15/24   Noe Boudreaux DO   ibuprofen (IBU) 600 MG tablet Take 1 tablet by mouth every 6 hours as needed for Pain  Patient not taking: Reported on 5/18/2024 6/2/21   Melody Donato PA-C        Current Medications:  Current Facility-Administered Medications   Medication Dose Route Frequency Provider Last Rate Last Admin    sodium chloride flush 0.9 % injection 10 mL  10 mL IntraVENous PRN Wallace Reed MD   10 mL at 05/23/24 
Mr. Handley's Hida scan will be performed first thing tomorrow morning.  Please keep him NPO after midnight and no narcotics after midnight as well.  Floor RN, Sonya made aware.  Questions call Nuclear Medicine extension [phone]      Deep GROVERMT, RT(MR)  
Patient returned from cath lab. VSS.   
Patient to cath lab.   
Patient transported to ultrasound.   
Pt asked if he could go out and smoke after visit from [facility]. This RN educated the patient he cannot leave the unit to smoke. Patient asked if he could sign out AMA. This RN educated the patient on the risks of leaving AMA including death. Patient verbalized understanding. Pt signed AMA form at 2051. Pt was alert and oriented x4 from this RN's assessment and deemed capable of making his own decisions. Provider notified.  
Pt asking for more water, first cup of the day given at midnight. Pt educated on fluid restriction and the allowance of 1500mL a day. Pt stated he understood, but still asking for more water. Given 480mL to pt so far since midnight.   
Pt complaining of pain at his IV site. RN went to assess the IV. IV flushed well but is painful, no swelling or redness at the site. This RN had the charge nurse come to evaluate the site as well, charge nurse noticed no infiltration. Will decrease the IV abx rate and will re-assess how the pt feels.   
Pt receiving IV nafcillin and c/o severe burning. IV noted to be infiltrated. This RN attempted to place new IV x2 unsuccessfully. DIT notified. IV nafcillin paused.  
Pt transported to CT with transport.   
Pts mary was in the room.  She was jerking and twitching.   The RN said he wanted her to leave that visiting hours were over.  She said, \"are you asking me to leave.\"  I said yes.  The RN and I felt like she was interrupting the care of patient.  I believe she was upset.  It was not our intentions.  
at 05/25/24 1027    sodium chloride Stopped (05/24/24 0206)    sodium chloride       Scheduled Medications    vancomycin  1,000 mg IntraVENous Q12H    sodium chloride flush  10 mL IntraVENous Once    sodium chloride flush  5-40 mL IntraVENous 2 times per day    nicotine  1 patch TransDERmal Daily    nafcillin  2,000 mg IntraVENous Q4H    sodium chloride flush  5-40 mL IntraVENous 2 times per day    sodium chloride flush  5-40 mL IntraVENous 2 times per day    enoxaparin  40 mg SubCUTAneous Daily     PRN Meds: sodium chloride flush, albuterol, sodium chloride flush, sodium chloride flush, sodium chloride, albuterol sulfate HFA, sodium chloride flush, sodium chloride, potassium chloride **OR** potassium alternative oral replacement **OR** potassium chloride, magnesium sulfate, ondansetron **OR** ondansetron, polyethylene glycol, acetaminophen **OR** acetaminophen, buprenorphine-naloxone, methocarbamol, diphenhydrAMINE      Intake/Output Summary (Last 24 hours) at 5/25/2024 1406  Last data filed at 5/25/2024 0930  Gross per 24 hour   Intake 5580.62 ml   Output 1225 ml   Net 4355.62 ml         Physical Exam Performed:    BP (!) 130/35   Pulse (!) 106   Temp 98.1 °F (36.7 °C)   Resp 20   Ht 1.88 m (6' 2.02\")   Wt 81.1 kg (178 lb 14.4 oz)   SpO2 90%   BMI 22.96 kg/m²     General appearance: Chronically ill appearing, thin  HEENT: Pupils equal, round, and reactive to light. Conjunctivae/corneas clear.  Neck: Supple, with full range of motion. No jugular venous distention. Trachea midline.  Respiratory:  Decreased in bases  Cardiovascular:   4/6 diastolic murmur in aortic and tricuspid areas.  Tachycardia  Abdomen: Soft, non-tender, non-distended with normal bowel sounds.  Musculoskeletal: No clubbing, cyanosis or edema bilaterally.  Full range of motion without deformity.  Skin: Skin color, texture, turgor normal.  No rashes or lesions.  Neurologic:  Neurovascularly intact without any focal sensory/motor deficits. 
reviewed and updated    No Known Allergies    Social history:     Social History:  All social andepidemiologic history was reviewed and updated by me today as needed.     Tobacco use:   reports that he has been smoking cigarettes. He has never used smokeless tobacco.  Alcohol use:   reports no history of alcohol use.  Currently lives in: [place] [id number]   reports current drug use. Drugs: IV and Opiates .     COVID VACCINATION AND LAB RESULT RECORDS:     Internal Administration   First Dose      Second Dose           Last COVID Lab SARS-CoV-2, NAAT (no units)   Date Value   05/15/2024 Not Detected     Rapid Strep A Screen (no units)   Date Value   05/15/2024 Negative            Assessment:     The patient is a 43 y.o. old male who  has a past medical history of Opiate abuse, episodic ([facility]). with following problems:    Methicillin-susceptible Staphylococcus aureus bacteremia and infective endocarditis with a 2D echo showing aortic and mitral valve manage lesions and a large tricuspid valve vegetation with concern for tricuspid valve leaflet perforation-has undergone transesophageal echo on 5/21/2024 confirming tricuspid and aortic valve vegetations  MRI of the brain indicates septic CNS embolism  Septic pulmonary emboli, noted on CT scan  Pericholecystic fluid, on the CT scan, rule out cholecystitis  Methicillin-susceptible Staphylococcus aureus urinary tract infection, likely secondary to the underlying bacteremia  Active IV drug user, positive urine drug screen for amphetamines, cocaine and fentanyl on 5/15/2024  Worsening leukocytosis : WBC count was 26,300 today  Thrombocytopenia in the setting of bacteremia  Elevated CRP of 63.9  Elevated sed rate  Transaminitis  Mild hyponatremia  Jaundice with serum bilirubin of 2  Negative HIV screen on 5/18/2024  Chronic hepatitis C with positive hepatitis C serology on 5/18/2024      Discussion:      The patient's blood cultures from 5/15/2024 had grown MSSA.  Repeat 
of tricuspid valve [I07.9]     Sepsis (HCC) [A41.9]     Complicated UTI (urinary tract infection) [N39.0]     Bacteremia due to methicillin susceptible Staphylococcus aureus (MSSA) [R78.81, B95.61]      Aortic and tricuspid valve endocarditis  Secondary to IVDA  Awaiting clearance of bacteremia  Positive blood cx from 5/22/24  Repeat blood cx today  Continue Nafcillin IV  CTS consulted, no plan for surgical intervention at this time  ID input appreciated  Off IV Lasix for acute diastolic CHF  Repeat CXR and BNP in AM  Maintain fluid restriction today  MSSA bacteremia  On Nafcillin IV  Sepsis  On Nafcillin IV  Septic emboli  Continue Nafcillin IV  IVDA  Counseled on cessation  Tobacco abuse disorder  Nicotine patch  Counseled on cessation for 12 minutes during this admission    DVT Prophylaxis: Lovenox  Diet: ADULT DIET; Regular; 1500 ml  Code Status: Full Code  PT/OT Eval Status: Requested    Dispo - SNF    Discussed with patient, nursing and CM.    Awaiting repeat blood cx.    Petey Spain MD    
vancomycin  1,500 mg IntraVENous Q12H    sodium chloride flush  10 mL IntraVENous Once    sodium chloride flush  5-40 mL IntraVENous 2 times per day    nicotine  1 patch TransDERmal Daily    nafcillin  2,000 mg IntraVENous Q4H    sodium chloride flush  5-40 mL IntraVENous 2 times per day    sodium chloride flush  5-40 mL IntraVENous 2 times per day    enoxaparin  40 mg SubCUTAneous Daily        sodium chloride 100 mL/hr at 05/24/24 1529    sodium chloride Stopped (05/24/24 0206)    sodium chloride         sodium chloride flush, albuterol, sodium chloride flush, sodium chloride flush, sodium chloride, albuterol sulfate HFA, sodium chloride flush, sodium chloride, potassium chloride **OR** potassium alternative oral replacement **OR** potassium chloride, magnesium sulfate, ondansetron **OR** ondansetron, polyethylene glycol, acetaminophen **OR** acetaminophen, buprenorphine-naloxone, methocarbamol, diphenhydrAMINE      Problem list:       Patient Active Problem List   Diagnosis Code    Tobacco use disorder-advised to quit F17.200    Drug use-(hx percocet/heroin use) F19.90    Hepatitis C-sent to gi for eval B19.20    Injury of tip of finger of left hand S69.92XA    Pneumonia in infectious disease J18.9    Complicated UTI (urinary tract infection) N39.0    Bacteremia due to methicillin susceptible Staphylococcus aureus (MSSA) R78.81, B95.61    Drug abuse ([facility]) F19.10    Sepsis ([facility]) A41.9    Bilateral swelling of feet M79.89    Acute bacterial endocarditis I33.0    Endocarditis of tricuspid valve I07.9    LFT elevation R79.89    Fever and chills R50.9    Neutrophilia D72.9    Intravenous drug abuse ([facility]) F19.10    Bacteremia R78.81    Hyponatremia E87.1    Jaundice R17    Perforation of leaflet of tricuspid valve I07.9    Thrombocytopenia ([facility]) D69.6    Bandemia D72.825    Elevated sed rate R70.0    Elevated C-reactive protein (CRP) R79.82    Transaminitis R74.01    Endocarditis of mitral valve I05.9    IV drug abuse

## 2024-05-28 LAB
BACTERIA BLD CULT ORG #2: NORMAL
BACTERIA BLD CULT: NORMAL

## 2024-05-28 NOTE — ADT AUTH CERT
trimethoprim-sulfamethoxazole <=10 mcg/mL Sensitive                     Linear View            Blood Culture:          Lab Results   Component Value Date/Time     BC   05/15/2024 05:05 PM       mecA gene not detected  See additional report for complete BCID panel.  No resistance genes detected.  Negative results for this marker do not indicate susceptibility,  as multiple mechanisms of resistance to Methicillin exist.  Please see full susceptibility report.        BC POSITIVE for  Isolated two of two sets    05/15/2024 05:05 PM     [id number]   05/15/2024 05:21 PM       POSITIVE for  No further workup  Isolated two of two sets  Refer to culture [id number] for sensitivity results            Viral Culture:           Lab Results   Component Value Date/Time     COVID19 Not Detected 05/15/2024 12:17 PM      Urine Culture:       Recent Labs     05/15/24  1632   LABURIN >100,000 CFU/ml         Scheduled Meds:  Scheduled Medications    sodium chloride flush  5-40 mL IntraVENous 2 times per day    enoxaparin  40 mg SubCUTAneous Daily    ceFAZolin  2,000 mg IntraVENous Q8H    azithromycin  500 mg IntraVENous Q24H            Continuous Infusions:  Infusions Meds    sodium chloride      sodium chloride 75 mL/hr at 05/18/24 0227            PRN Meds:  PRN Medications   sodium chloride flush, sodium chloride, ondansetron **OR** ondansetron, polyethylene glycol, acetaminophen **OR** acetaminophen, albuterol, buprenorphine-naloxone, methocarbamol, ibuprofen, cloNIDine, loperamide, dicyclomine, gabapentin, hydrOXYzine pamoate, promethazine        Imaging:   XR CHEST PORTABLE   Final Result   1.  Improving airspace infiltrates compatible with improving pneumonia.       2.  Left lower lobe atelectasis           Vascular duplex lower extremity venous bilateral    (Results Pending)         TTE  5/17/24      Left Ventricle: Normal left ventricular systolic function with a visually estimated EF of 55 - 60%. Left ventricle size is normal.

## 2024-05-29 LAB
BACTERIA FLD AEROBE CULT: NORMAL
GRAM STN SPEC: NORMAL

## 2024-06-04 ENCOUNTER — TELEPHONE (OUTPATIENT)
Dept: CARDIOLOGY CLINIC | Age: 44
End: 2024-06-04

## 2024-06-04 LAB
ACID FAST STN SPEC QL: NORMAL
MYCOBACTERIUM SPEC CULT: NORMAL

## 2024-06-04 NOTE — TELEPHONE ENCOUNTER
Carlene called the Echo Dep and no one answered.  Carlene is requesting the images of the Transesophageal Echo that was done on 05/21.  Carlene is requesting a call when the images are sent.  Please advise.  Thank you

## 2024-06-04 NOTE — TELEPHONE ENCOUNTER
Mr. Handley is not an office patient of ours. Also, we do not have capability to push hospital testing images. Provided Carlene with echo lab and cath lab phone numbers.

## 2024-06-11 LAB
ACID FAST STN SPEC QL: NORMAL
MYCOBACTERIUM SPEC CULT: NORMAL

## 2024-06-18 LAB
ACID FAST STN SPEC QL: NORMAL
MYCOBACTERIUM SPEC CULT: NORMAL

## 2024-06-25 LAB
ACID FAST STN SPEC QL: NORMAL
MYCOBACTERIUM SPEC CULT: NORMAL

## 2024-07-02 LAB
ACID FAST STN SPEC QL: NORMAL
MYCOBACTERIUM SPEC CULT: NORMAL

## 2024-07-09 LAB
ACID FAST STN SPEC QL: NORMAL
MYCOBACTERIUM SPEC CULT: NORMAL